# Patient Record
Sex: FEMALE | Race: BLACK OR AFRICAN AMERICAN | NOT HISPANIC OR LATINO
[De-identification: names, ages, dates, MRNs, and addresses within clinical notes are randomized per-mention and may not be internally consistent; named-entity substitution may affect disease eponyms.]

---

## 2017-02-22 NOTE — ASU PATIENT PROFILE, ADULT - PMH
Asthma    Bipolar disorder    GERD (gastroesophageal reflux disease)    Intramural leiomyoma of uterus

## 2017-03-02 PROBLEM — J45.909 UNSPECIFIED ASTHMA, UNCOMPLICATED: Chronic | Status: ACTIVE | Noted: 2017-02-22

## 2017-03-02 PROBLEM — K21.9 GASTRO-ESOPHAGEAL REFLUX DISEASE WITHOUT ESOPHAGITIS: Chronic | Status: ACTIVE | Noted: 2017-02-22

## 2017-03-02 PROBLEM — F31.9 BIPOLAR DISORDER, UNSPECIFIED: Chronic | Status: ACTIVE | Noted: 2017-02-22

## 2017-03-02 PROBLEM — D25.1 INTRAMURAL LEIOMYOMA OF UTERUS: Chronic | Status: ACTIVE | Noted: 2017-02-22

## 2017-03-04 ENCOUNTER — FORM ENCOUNTER (OUTPATIENT)
Age: 47
End: 2017-03-04

## 2017-03-05 ENCOUNTER — OUTPATIENT (OUTPATIENT)
Dept: OUTPATIENT SERVICES | Facility: HOSPITAL | Age: 47
LOS: 1 days | End: 2017-03-05
Payer: COMMERCIAL

## 2017-03-05 DIAGNOSIS — Z98.890 OTHER SPECIFIED POSTPROCEDURAL STATES: Chronic | ICD-10-CM

## 2017-03-05 DIAGNOSIS — Z41.9 ENCOUNTER FOR PROCEDURE FOR PURPOSES OTHER THAN REMEDYING HEALTH STATE, UNSPECIFIED: Chronic | ICD-10-CM

## 2017-03-05 PROCEDURE — 71250 CT THORAX DX C-: CPT | Mod: 26

## 2017-03-08 ENCOUNTER — RESULT REVIEW (OUTPATIENT)
Age: 47
End: 2017-03-08

## 2017-03-09 ENCOUNTER — OUTPATIENT (OUTPATIENT)
Dept: OUTPATIENT SERVICES | Facility: HOSPITAL | Age: 47
LOS: 1 days | Discharge: HOME | End: 2017-03-09
Payer: COMMERCIAL

## 2017-03-09 VITALS
DIASTOLIC BLOOD PRESSURE: 69 MMHG | SYSTOLIC BLOOD PRESSURE: 119 MMHG | HEART RATE: 92 BPM | OXYGEN SATURATION: 98 % | TEMPERATURE: 98 F | RESPIRATION RATE: 18 BRPM

## 2017-03-09 VITALS
RESPIRATION RATE: 18 BRPM | TEMPERATURE: 98 F | HEART RATE: 89 BPM | WEIGHT: 246.04 LBS | SYSTOLIC BLOOD PRESSURE: 129 MMHG | OXYGEN SATURATION: 99 % | HEIGHT: 63 IN | DIASTOLIC BLOOD PRESSURE: 76 MMHG

## 2017-03-09 DIAGNOSIS — Z98.890 OTHER SPECIFIED POSTPROCEDURAL STATES: Chronic | ICD-10-CM

## 2017-03-09 DIAGNOSIS — Z41.9 ENCOUNTER FOR PROCEDURE FOR PURPOSES OTHER THAN REMEDYING HEALTH STATE, UNSPECIFIED: Chronic | ICD-10-CM

## 2017-03-09 PROCEDURE — 58561 HYSTEROSCOPY REMOVE MYOMA: CPT

## 2017-03-09 PROCEDURE — 88305 TISSUE EXAM BY PATHOLOGIST: CPT

## 2017-03-09 RX ORDER — IBUPROFEN 200 MG
800 TABLET ORAL ONCE
Qty: 0 | Refills: 0 | Status: DISCONTINUED | OUTPATIENT
Start: 2017-03-09 | End: 2017-03-09

## 2017-03-09 RX ORDER — MORPHINE SULFATE 50 MG/1
4 CAPSULE, EXTENDED RELEASE ORAL
Qty: 0 | Refills: 0 | Status: DISCONTINUED | OUTPATIENT
Start: 2017-03-09 | End: 2017-03-09

## 2017-03-09 RX ORDER — SODIUM CHLORIDE 9 MG/ML
1000 INJECTION, SOLUTION INTRAVENOUS
Qty: 0 | Refills: 0 | Status: DISCONTINUED | OUTPATIENT
Start: 2017-03-09 | End: 2017-03-09

## 2017-03-09 RX ADMIN — MORPHINE SULFATE 4 MILLIGRAM(S): 50 CAPSULE, EXTENDED RELEASE ORAL at 12:50

## 2017-03-09 RX ADMIN — MORPHINE SULFATE 4 MILLIGRAM(S): 50 CAPSULE, EXTENDED RELEASE ORAL at 13:02

## 2017-03-13 LAB — SURGICAL PATHOLOGY STUDY: SIGNIFICANT CHANGE UP

## 2017-03-16 DIAGNOSIS — D25.9 LEIOMYOMA OF UTERUS, UNSPECIFIED: ICD-10-CM

## 2017-03-16 DIAGNOSIS — E66.9 OBESITY, UNSPECIFIED: ICD-10-CM

## 2017-03-16 DIAGNOSIS — Z79.899 OTHER LONG TERM (CURRENT) DRUG THERAPY: ICD-10-CM

## 2017-03-16 DIAGNOSIS — M13.88 OTHER SPECIFIED ARTHRITIS, OTHER SITE: ICD-10-CM

## 2017-03-16 DIAGNOSIS — D64.9 ANEMIA, UNSPECIFIED: ICD-10-CM

## 2017-04-13 PROCEDURE — 71250 CT THORAX DX C-: CPT

## 2017-05-07 ENCOUNTER — EMERGENCY (EMERGENCY)
Facility: HOSPITAL | Age: 47
LOS: 1 days | Discharge: HOME | End: 2017-05-07
Attending: EMERGENCY MEDICINE | Admitting: EMERGENCY MEDICINE
Payer: MEDICAID

## 2017-05-07 VITALS
SYSTOLIC BLOOD PRESSURE: 140 MMHG | TEMPERATURE: 99 F | HEIGHT: 63 IN | DIASTOLIC BLOOD PRESSURE: 96 MMHG | HEART RATE: 79 BPM | OXYGEN SATURATION: 100 % | WEIGHT: 237 LBS | RESPIRATION RATE: 18 BRPM

## 2017-05-07 DIAGNOSIS — Z41.9 ENCOUNTER FOR PROCEDURE FOR PURPOSES OTHER THAN REMEDYING HEALTH STATE, UNSPECIFIED: Chronic | ICD-10-CM

## 2017-05-07 DIAGNOSIS — M79.662 PAIN IN LEFT LOWER LEG: ICD-10-CM

## 2017-05-07 DIAGNOSIS — M79.652 PAIN IN LEFT THIGH: ICD-10-CM

## 2017-05-07 DIAGNOSIS — M79.651 PAIN IN RIGHT THIGH: ICD-10-CM

## 2017-05-07 DIAGNOSIS — Z98.890 OTHER SPECIFIED POSTPROCEDURAL STATES: Chronic | ICD-10-CM

## 2017-05-07 DIAGNOSIS — J45.909 UNSPECIFIED ASTHMA, UNCOMPLICATED: ICD-10-CM

## 2017-05-07 PROCEDURE — 99283 EMERGENCY DEPT VISIT LOW MDM: CPT

## 2017-05-07 RX ORDER — IBUPROFEN 200 MG
800 TABLET ORAL ONCE
Qty: 0 | Refills: 0 | Status: COMPLETED | OUTPATIENT
Start: 2017-05-07 | End: 2017-05-07

## 2017-05-07 RX ORDER — IBUPROFEN 200 MG
1 TABLET ORAL
Qty: 15 | Refills: 0
Start: 2017-05-07 | End: 2017-05-12

## 2017-05-07 RX ADMIN — Medication 800 MILLIGRAM(S): at 20:21

## 2017-05-07 NOTE — ED PROVIDER NOTE - MUSCULOSKELETAL, MLM
no spinal tend, FROM; LE: + symmetry, no calf swelling b/l, no tend b/l, + light touch b/l, soft compartments, + discomfort to thighs w/ambulation -- normal gait, pedal pulse 2+ b/l

## 2017-05-07 NOTE — ED ADULT TRIAGE NOTE - CHIEF COMPLAINT QUOTE
Pt presented to ED with bilateral legs pain. Pt denies any injury, long hr driving nor air travel. As per pt, onset of pain was 4 days ago and it gradually worsened. Pt has ambulated to ED with steady gait.

## 2017-05-07 NOTE — ED ADULT NURSE NOTE - OBJECTIVE STATEMENT
Patient arrived to ED via walk-in stating, "My legs hurt."  Patient is A&Ox3, in NAD, complaining of 9/10 atraumatic leg pain with numbness to R 5th toe for four days.  Patient denies any other complaints at this time.  Will continue with plan of care.

## 2017-05-07 NOTE — ED PROVIDER NOTE - OBJECTIVE STATEMENT
The pt is a 47 y/o F, who presents to ED c/o thigh pain x few d. Pt states that is on her feet all day at work, pain to thighs only, 5/10, took 400 mg motrin w/o relief yest. Denies injury, decreased ROM, calf swelling or pain, cp, sob, fevers, chills, non smoker

## 2017-05-07 NOTE — ED PROVIDER NOTE - MEDICAL DECISION MAKING DETAILS
atraumatic thigh pain b/l, no calf pain or swelling, no DVT risk factors, normal exam - pain reproducible w/ambulation, consistent w/muscular, will tx nsaids and to rest, f/u w/pmd

## 2017-07-18 ENCOUNTER — EMERGENCY (EMERGENCY)
Facility: HOSPITAL | Age: 47
LOS: 1 days | Discharge: HOME | End: 2017-07-18
Admitting: EMERGENCY MEDICINE
Payer: MEDICAID

## 2017-07-18 VITALS
DIASTOLIC BLOOD PRESSURE: 92 MMHG | OXYGEN SATURATION: 99 % | RESPIRATION RATE: 16 BRPM | WEIGHT: 229.94 LBS | HEIGHT: 63 IN | SYSTOLIC BLOOD PRESSURE: 151 MMHG | TEMPERATURE: 98 F | HEART RATE: 82 BPM

## 2017-07-18 DIAGNOSIS — Z98.890 OTHER SPECIFIED POSTPROCEDURAL STATES: Chronic | ICD-10-CM

## 2017-07-18 DIAGNOSIS — J45.909 UNSPECIFIED ASTHMA, UNCOMPLICATED: ICD-10-CM

## 2017-07-18 DIAGNOSIS — Z41.9 ENCOUNTER FOR PROCEDURE FOR PURPOSES OTHER THAN REMEDYING HEALTH STATE, UNSPECIFIED: Chronic | ICD-10-CM

## 2017-07-18 DIAGNOSIS — F17.200 NICOTINE DEPENDENCE, UNSPECIFIED, UNCOMPLICATED: ICD-10-CM

## 2017-07-18 DIAGNOSIS — M79.671 PAIN IN RIGHT FOOT: ICD-10-CM

## 2017-07-18 DIAGNOSIS — Z79.1 LONG TERM (CURRENT) USE OF NON-STEROIDAL ANTI-INFLAMMATORIES (NSAID): ICD-10-CM

## 2017-07-18 PROCEDURE — 73630 X-RAY EXAM OF FOOT: CPT | Mod: 26,RT

## 2017-07-18 PROCEDURE — 99283 EMERGENCY DEPT VISIT LOW MDM: CPT | Mod: 25

## 2017-07-18 PROCEDURE — 73630 X-RAY EXAM OF FOOT: CPT

## 2017-07-18 PROCEDURE — 73630 X-RAY EXAM OF FOOT: CPT | Mod: 26

## 2017-07-18 RX ORDER — IBUPROFEN 200 MG
600 TABLET ORAL ONCE
Qty: 0 | Refills: 0 | Status: COMPLETED | OUTPATIENT
Start: 2017-07-18 | End: 2017-07-18

## 2017-07-18 RX ADMIN — Medication 600 MILLIGRAM(S): at 22:17

## 2017-07-18 NOTE — ED ADULT NURSE NOTE - OBJECTIVE STATEMENT
45 y/o female c/o right foot pain for two weeks. states it is more swollen and painful while standing/walking and when asleep. denies any trauma/injury.

## 2017-07-18 NOTE — ED PROVIDER NOTE - MEDICAL DECISION MAKING DETAILS
right foot pain x 1 week along lateral aspect 5th MT and plantar aspect along heel-midfoot.  has been doing a lot of walking, so concern for stress fx or possibly plantar fasciitis.  No signs of infection, no foreign body appreciated on exam or XR, no e/o compartment syndrome.  XR neg for fx.  Plan f/u podiatry. right foot pain x 1 week along lateral aspect 5th MT and plantar aspect along heel-midfoot.  has been doing a lot of walking, so concern for stress fx; doubt plantar fasciitis.  Good circulation in foot and toes.  No signs of infection, no foreign body appreciated on exam or XR, no e/o compartment syndrome.  XR neg for fx.  Plan f/u podiatry.

## 2017-07-18 NOTE — ED ADULT TRIAGE NOTE - CHIEF COMPLAINT QUOTE
"I have pain to my right foot for about one week and it hurts very bad to walk on it and I have swelling on and off". Pt denies injury to foot.

## 2017-07-18 NOTE — ED PROVIDER NOTE - PHYSICAL EXAMINATION
CONSTITUTIONAL: WD,WN. NAD.    SKIN: Normal color and turgor. No rash.    EYES: Conjunctiva clear. Pupils equal and round.  ENT: Airway patent, OP clear. Nasal mucosa clear, no rhinorrhea.   RESPIRATORY:  Breathing non-labored. No retractions or accessory muscle use.  Lungs CTA bilat.  CARDIOVASCULAR:  RRR, S1S2. No M/R/G.      GI:  Abdomen soft, nontender.    MSK: No joint swelling.  No neck or back tenderness.  No calf swelling or tenderness. TTP along base of right 5th MT. Flatfooted, but no swelling of foot.  + 2 DP and PT pulses.  Cap refill < 2 sec in toes.  NEURO: Alert and oriented; Normal strength in all extremities.  SILT.  Ambulates with mild limp.

## 2017-07-18 NOTE — ED PROVIDER NOTE - OBJECTIVE STATEMENT
pt with pain in right foot x 1 week.  pain located mainly along lateral aspect of the foot.  painful to walk, but it also seems to be worse upon waking in the morning.  no history of trauma, but has had increase in amount of time spent on her feet at work and walking recently.  no tingling or numbness in toes.  no leg or foot swelling.  takes gabapentin for chronic back pain.

## 2017-07-18 NOTE — ED ADULT NURSE NOTE - CHPI ED SYMPTOMS NEG
no weakness/no fever/no numbness/no deformity/no tingling/no abrasion/no bruising/no back pain/no stiffness

## 2017-07-18 NOTE — ED PROVIDER NOTE - NS ED ROS FT
CONSTITUTIONAL: No fever, chills, or weakness  NEURO: No headache, no dizziness, no syncope.  No focal weakness.  EYES: No visual changes  ENT: No rhinorrhea or sore throat  PULM: No cough or dyspnea  CV: No chest pain or palpitations  GI:  No diarrhea or fecal incontinence.  :  No urinary retention or incontinence.  MSK: chronic back pain, at baseline without recent change  SKIN: no rash

## 2017-08-09 ENCOUNTER — EMERGENCY (EMERGENCY)
Facility: HOSPITAL | Age: 47
LOS: 1 days | Discharge: HOME | End: 2017-08-09
Admitting: EMERGENCY MEDICINE
Payer: COMMERCIAL

## 2017-08-09 VITALS
RESPIRATION RATE: 18 BRPM | SYSTOLIC BLOOD PRESSURE: 118 MMHG | DIASTOLIC BLOOD PRESSURE: 86 MMHG | HEART RATE: 97 BPM | TEMPERATURE: 98 F | OXYGEN SATURATION: 98 % | WEIGHT: 231.04 LBS

## 2017-08-09 VITALS
OXYGEN SATURATION: 99 % | SYSTOLIC BLOOD PRESSURE: 107 MMHG | DIASTOLIC BLOOD PRESSURE: 72 MMHG | TEMPERATURE: 98 F | RESPIRATION RATE: 18 BRPM | HEART RATE: 90 BPM

## 2017-08-09 DIAGNOSIS — M25.561 PAIN IN RIGHT KNEE: ICD-10-CM

## 2017-08-09 DIAGNOSIS — Z98.890 OTHER SPECIFIED POSTPROCEDURAL STATES: Chronic | ICD-10-CM

## 2017-08-09 DIAGNOSIS — Z41.9 ENCOUNTER FOR PROCEDURE FOR PURPOSES OTHER THAN REMEDYING HEALTH STATE, UNSPECIFIED: Chronic | ICD-10-CM

## 2017-08-09 DIAGNOSIS — Z79.1 LONG TERM (CURRENT) USE OF NON-STEROIDAL ANTI-INFLAMMATORIES (NSAID): ICD-10-CM

## 2017-08-09 PROCEDURE — 96372 THER/PROPH/DIAG INJ SC/IM: CPT

## 2017-08-09 PROCEDURE — 73562 X-RAY EXAM OF KNEE 3: CPT

## 2017-08-09 PROCEDURE — 73562 X-RAY EXAM OF KNEE 3: CPT | Mod: 26,RT

## 2017-08-09 PROCEDURE — 99284 EMERGENCY DEPT VISIT MOD MDM: CPT

## 2017-08-09 PROCEDURE — 99283 EMERGENCY DEPT VISIT LOW MDM: CPT | Mod: 25

## 2017-08-09 RX ORDER — KETOROLAC TROMETHAMINE 30 MG/ML
60 SYRINGE (ML) INJECTION ONCE
Qty: 0 | Refills: 0 | Status: DISCONTINUED | OUTPATIENT
Start: 2017-08-09 | End: 2017-08-09

## 2017-08-09 RX ADMIN — Medication 60 MILLIGRAM(S): at 13:12

## 2017-08-09 NOTE — ED ADULT NURSE NOTE - OBJECTIVE STATEMENT
46 year old female patient with c/o of R knee pain since last night.  Denies trauma or falls.  No distress noted.

## 2017-08-09 NOTE — ED ADULT NURSE NOTE - CHPI ED SYMPTOMS NEG
no vomiting/no fever/no decreased eating/drinking/no pain/no numbness/no dizziness/no chills/no weakness/no nausea/no tingling

## 2017-08-09 NOTE — DOWNTIME INTERRUPTION NOTE - WHICH MANUAL FORMS INITIATED?
documents scanned Nursing documentation completed electronically, otherwise on downtime. documents scanned

## 2018-02-07 ENCOUNTER — APPOINTMENT (OUTPATIENT)
Dept: PULMONOLOGY | Facility: CLINIC | Age: 48
End: 2018-02-07
Payer: MEDICAID

## 2018-02-07 PROCEDURE — 71046 X-RAY EXAM CHEST 2 VIEWS: CPT

## 2018-02-07 PROCEDURE — 94010 BREATHING CAPACITY TEST: CPT

## 2018-02-07 PROCEDURE — 99214 OFFICE O/P EST MOD 30 MIN: CPT | Mod: 25

## 2018-07-29 NOTE — ED PROVIDER NOTE - ENMT, MLM
Airway patent, Nasal mucosa clear. Mouth with normal mucosa. no fever, no chills, no night sweats, no palpitations, no cough, no nausea, no vomiting, no diarrhea, no abd pain, no numbness, no tingling, no weakness

## 2019-02-13 ENCOUNTER — EMERGENCY (EMERGENCY)
Facility: HOSPITAL | Age: 49
LOS: 1 days | Discharge: HOME | End: 2019-02-13
Attending: EMERGENCY MEDICINE | Admitting: EMERGENCY MEDICINE
Payer: MEDICAID

## 2019-02-13 VITALS
RESPIRATION RATE: 20 BRPM | TEMPERATURE: 97 F | OXYGEN SATURATION: 98 % | HEIGHT: 63 IN | HEART RATE: 86 BPM | DIASTOLIC BLOOD PRESSURE: 101 MMHG | SYSTOLIC BLOOD PRESSURE: 165 MMHG | WEIGHT: 240.08 LBS

## 2019-02-13 VITALS
SYSTOLIC BLOOD PRESSURE: 141 MMHG | DIASTOLIC BLOOD PRESSURE: 96 MMHG | RESPIRATION RATE: 18 BRPM | HEART RATE: 70 BPM | OXYGEN SATURATION: 97 %

## 2019-02-13 DIAGNOSIS — R51 HEADACHE: ICD-10-CM

## 2019-02-13 DIAGNOSIS — F31.9 BIPOLAR DISORDER, UNSPECIFIED: ICD-10-CM

## 2019-02-13 DIAGNOSIS — Z41.9 ENCOUNTER FOR PROCEDURE FOR PURPOSES OTHER THAN REMEDYING HEALTH STATE, UNSPECIFIED: Chronic | ICD-10-CM

## 2019-02-13 DIAGNOSIS — Z79.1 LONG TERM (CURRENT) USE OF NON-STEROIDAL ANTI-INFLAMMATORIES (NSAID): ICD-10-CM

## 2019-02-13 DIAGNOSIS — Z98.890 OTHER SPECIFIED POSTPROCEDURAL STATES: Chronic | ICD-10-CM

## 2019-02-13 DIAGNOSIS — J45.909 UNSPECIFIED ASTHMA, UNCOMPLICATED: ICD-10-CM

## 2019-02-13 LAB
ALBUMIN SERPL ELPH-MCNC: 4.6 G/DL — SIGNIFICANT CHANGE UP (ref 3.3–5)
ALP SERPL-CCNC: 75 U/L — SIGNIFICANT CHANGE UP (ref 40–120)
ALT FLD-CCNC: 19 U/L — SIGNIFICANT CHANGE UP (ref 10–45)
ANION GAP SERPL CALC-SCNC: 17 MMOL/L — SIGNIFICANT CHANGE UP (ref 5–17)
AST SERPL-CCNC: 24 U/L — SIGNIFICANT CHANGE UP (ref 10–40)
BASOPHILS # BLD AUTO: 0.03 K/UL — SIGNIFICANT CHANGE UP (ref 0–0.2)
BASOPHILS NFR BLD AUTO: 0.6 % — SIGNIFICANT CHANGE UP (ref 0–2)
BILIRUB SERPL-MCNC: 0.6 MG/DL — SIGNIFICANT CHANGE UP (ref 0.2–1.2)
BUN SERPL-MCNC: 21 MG/DL — SIGNIFICANT CHANGE UP (ref 7–23)
CALCIUM SERPL-MCNC: 10 MG/DL — SIGNIFICANT CHANGE UP (ref 8.4–10.5)
CHLORIDE SERPL-SCNC: 100 MMOL/L — SIGNIFICANT CHANGE UP (ref 96–108)
CO2 SERPL-SCNC: 24 MMOL/L — SIGNIFICANT CHANGE UP (ref 22–31)
CREAT SERPL-MCNC: 0.6 MG/DL — SIGNIFICANT CHANGE UP (ref 0.5–1.3)
EOSINOPHIL # BLD AUTO: 0.09 K/UL — SIGNIFICANT CHANGE UP (ref 0–0.5)
EOSINOPHIL NFR BLD AUTO: 1.7 % — SIGNIFICANT CHANGE UP (ref 0–6)
GLUCOSE SERPL-MCNC: 114 MG/DL — HIGH (ref 70–99)
HCG SERPL-ACNC: <.1 MIU/ML — SIGNIFICANT CHANGE UP
HCT VFR BLD CALC: 43.5 % — SIGNIFICANT CHANGE UP (ref 34.5–45)
HGB BLD-MCNC: 14.6 G/DL — SIGNIFICANT CHANGE UP (ref 11.5–15.5)
IMM GRANULOCYTES NFR BLD AUTO: 0.2 % — SIGNIFICANT CHANGE UP (ref 0–1.5)
LIDOCAIN IGE QN: 27 U/L — SIGNIFICANT CHANGE UP (ref 7–60)
LYMPHOCYTES # BLD AUTO: 1.76 K/UL — SIGNIFICANT CHANGE UP (ref 1–3.3)
LYMPHOCYTES # BLD AUTO: 33.7 % — SIGNIFICANT CHANGE UP (ref 13–44)
MAGNESIUM SERPL-MCNC: 2.2 MG/DL — SIGNIFICANT CHANGE UP (ref 1.6–2.6)
MCHC RBC-ENTMCNC: 31.1 PG — SIGNIFICANT CHANGE UP (ref 27–34)
MCHC RBC-ENTMCNC: 33.6 GM/DL — SIGNIFICANT CHANGE UP (ref 32–36)
MCV RBC AUTO: 92.8 FL — SIGNIFICANT CHANGE UP (ref 80–100)
MONOCYTES # BLD AUTO: 0.37 K/UL — SIGNIFICANT CHANGE UP (ref 0–0.9)
MONOCYTES NFR BLD AUTO: 7.1 % — SIGNIFICANT CHANGE UP (ref 2–14)
NEUTROPHILS # BLD AUTO: 2.97 K/UL — SIGNIFICANT CHANGE UP (ref 1.8–7.4)
NEUTROPHILS NFR BLD AUTO: 56.7 % — SIGNIFICANT CHANGE UP (ref 43–77)
NRBC # BLD: 0 /100 WBCS — SIGNIFICANT CHANGE UP (ref 0–0)
PLATELET # BLD AUTO: 367 K/UL — SIGNIFICANT CHANGE UP (ref 150–400)
POTASSIUM SERPL-MCNC: 3.7 MMOL/L — SIGNIFICANT CHANGE UP (ref 3.5–5.3)
POTASSIUM SERPL-SCNC: 3.7 MMOL/L — SIGNIFICANT CHANGE UP (ref 3.5–5.3)
PROT SERPL-MCNC: 7.8 G/DL — SIGNIFICANT CHANGE UP (ref 6–8.3)
RAPID RVP RESULT: SIGNIFICANT CHANGE UP
RBC # BLD: 4.69 M/UL — SIGNIFICANT CHANGE UP (ref 3.8–5.2)
RBC # FLD: 11.7 % — SIGNIFICANT CHANGE UP (ref 10.3–14.5)
SODIUM SERPL-SCNC: 141 MMOL/L — SIGNIFICANT CHANGE UP (ref 135–145)
WBC # BLD: 5.23 K/UL — SIGNIFICANT CHANGE UP (ref 3.8–10.5)
WBC # FLD AUTO: 5.23 K/UL — SIGNIFICANT CHANGE UP (ref 3.8–10.5)

## 2019-02-13 PROCEDURE — 36415 COLL VENOUS BLD VENIPUNCTURE: CPT

## 2019-02-13 PROCEDURE — 87581 M.PNEUMON DNA AMP PROBE: CPT

## 2019-02-13 PROCEDURE — 96374 THER/PROPH/DIAG INJ IV PUSH: CPT

## 2019-02-13 PROCEDURE — 85025 COMPLETE CBC W/AUTO DIFF WBC: CPT

## 2019-02-13 PROCEDURE — 70450 CT HEAD/BRAIN W/O DYE: CPT | Mod: 26

## 2019-02-13 PROCEDURE — 71046 X-RAY EXAM CHEST 2 VIEWS: CPT | Mod: 26

## 2019-02-13 PROCEDURE — 71046 X-RAY EXAM CHEST 2 VIEWS: CPT

## 2019-02-13 PROCEDURE — 70450 CT HEAD/BRAIN W/O DYE: CPT

## 2019-02-13 PROCEDURE — 83735 ASSAY OF MAGNESIUM: CPT

## 2019-02-13 PROCEDURE — 83690 ASSAY OF LIPASE: CPT

## 2019-02-13 PROCEDURE — 80053 COMPREHEN METABOLIC PANEL: CPT

## 2019-02-13 PROCEDURE — 84702 CHORIONIC GONADOTROPIN TEST: CPT

## 2019-02-13 PROCEDURE — 87633 RESP VIRUS 12-25 TARGETS: CPT

## 2019-02-13 PROCEDURE — 99284 EMERGENCY DEPT VISIT MOD MDM: CPT | Mod: 25

## 2019-02-13 PROCEDURE — 87798 DETECT AGENT NOS DNA AMP: CPT

## 2019-02-13 PROCEDURE — 99284 EMERGENCY DEPT VISIT MOD MDM: CPT

## 2019-02-13 PROCEDURE — 87486 CHLMYD PNEUM DNA AMP PROBE: CPT

## 2019-02-13 RX ORDER — ACETAMINOPHEN 500 MG
650 TABLET ORAL ONCE
Qty: 0 | Refills: 0 | Status: COMPLETED | OUTPATIENT
Start: 2019-02-13 | End: 2019-02-13

## 2019-02-13 RX ORDER — METOCLOPRAMIDE HCL 10 MG
10 TABLET ORAL ONCE
Qty: 0 | Refills: 0 | Status: COMPLETED | OUTPATIENT
Start: 2019-02-13 | End: 2019-02-13

## 2019-02-13 RX ORDER — SODIUM CHLORIDE 9 MG/ML
1000 INJECTION INTRAMUSCULAR; INTRAVENOUS; SUBCUTANEOUS ONCE
Qty: 0 | Refills: 0 | Status: COMPLETED | OUTPATIENT
Start: 2019-02-13 | End: 2019-02-13

## 2019-02-13 RX ORDER — AMLODIPINE BESYLATE 2.5 MG/1
5 TABLET ORAL ONCE
Qty: 0 | Refills: 0 | Status: COMPLETED | OUTPATIENT
Start: 2019-02-13 | End: 2019-02-13

## 2019-02-13 RX ORDER — AMLODIPINE BESYLATE 2.5 MG/1
1 TABLET ORAL
Qty: 30 | Refills: 0
Start: 2019-02-13 | End: 2019-03-14

## 2019-02-13 RX ORDER — KETOROLAC TROMETHAMINE 30 MG/ML
30 SYRINGE (ML) INJECTION ONCE
Qty: 0 | Refills: 0 | Status: DISCONTINUED | OUTPATIENT
Start: 2019-02-13 | End: 2019-02-13

## 2019-02-13 RX ADMIN — Medication 650 MILLIGRAM(S): at 21:18

## 2019-02-13 RX ADMIN — Medication 30 MILLIGRAM(S): at 22:35

## 2019-02-13 RX ADMIN — SODIUM CHLORIDE 1000 MILLILITER(S): 9 INJECTION INTRAMUSCULAR; INTRAVENOUS; SUBCUTANEOUS at 21:19

## 2019-02-13 RX ADMIN — Medication 10 MILLIGRAM(S): at 21:19

## 2019-02-13 RX ADMIN — AMLODIPINE BESYLATE 5 MILLIGRAM(S): 2.5 TABLET ORAL at 21:18

## 2019-02-13 NOTE — ED PROVIDER NOTE - CLINICAL SUMMARY MEDICAL DECISION MAKING FREE TEXT BOX
several recent elevated BP values at visits + counseled -> supportive care and feeling improved -> CT results relayed and f/u stressed

## 2019-02-13 NOTE — ED ADULT NURSE NOTE - OBJECTIVE STATEMENT
pt received into  spot A&Ox3 ambulatory appears comfortable complaining of nonproductive cough head ache and high blood pressure. States she went to [facility] care was given tessalon pearls without relief. Pt has 9/10 head ache denies vision changes N/V fall trauma/ injury. Reports she has been told her BP was high in the past but was never on medications. No numbness/ weakness no slurred speech or facial droop. 20G PIV placed to RAC labs drawn and sent pt in nad informed and agreeable to plan will monitor and reassess

## 2019-02-13 NOTE — ED ADULT NURSE NOTE - NSIMPLEMENTINTERV_GEN_ALL_ED
Implemented All Universal Safety Interventions:  [place] to call system. Call bell, personal items and telephone within reach. Instruct patient to call for assistance. Room bathroom lighting operational. Non-slip footwear when patient is off stretcher. Physically safe environment: no spills, clutter or unnecessary equipment. Stretcher in lowest position, wheels locked, appropriate side rails in place.

## 2019-02-13 NOTE — ED ADULT TRIAGE NOTE - CHIEF COMPLAINT QUOTE
Patient c/o severe headache , nausea , feeling tired , cough and nasal congestion for 5 days . Denies any vomiting nor blurry vision .

## 2019-02-13 NOTE — ED PROVIDER NOTE - ATTENDING CONTRIBUTION TO CARE
I have seen the pt, reviewed all pertinent clinical data, and I agree with the documentation/care/plan executed by TEDDY Hays.

## 2019-03-07 NOTE — ED ADULT NURSE NOTE - PRO INTERPRETER NEED 2
Called and left msg for pt to call back to establish care regarding Anticoagulation referral for Dionne from [facility] on 2/5/19.    Unable to establish care, will send letter and FYI to referring provider.     [facility] at [phone], fax [phone]    Suzette Camarena, ReginaD      
English

## 2019-06-03 ENCOUNTER — APPOINTMENT (OUTPATIENT)
Dept: HEART AND VASCULAR | Facility: CLINIC | Age: 49
End: 2019-06-03
Payer: MEDICAID

## 2019-06-03 VITALS
SYSTOLIC BLOOD PRESSURE: 120 MMHG | HEART RATE: 117 BPM | DIASTOLIC BLOOD PRESSURE: 80 MMHG | TEMPERATURE: 98.3 F | HEIGHT: 62 IN | BODY MASS INDEX: 44.16 KG/M2 | OXYGEN SATURATION: 98 % | WEIGHT: 240 LBS

## 2019-06-03 DIAGNOSIS — Z72.3 LACK OF PHYSICAL EXERCISE: ICD-10-CM

## 2019-06-03 DIAGNOSIS — Z83.3 FAMILY HISTORY OF DIABETES MELLITUS: ICD-10-CM

## 2019-06-03 DIAGNOSIS — I10 ESSENTIAL (PRIMARY) HYPERTENSION: ICD-10-CM

## 2019-06-03 DIAGNOSIS — Z87.891 PERSONAL HISTORY OF NICOTINE DEPENDENCE: ICD-10-CM

## 2019-06-03 DIAGNOSIS — Z78.9 OTHER SPECIFIED HEALTH STATUS: ICD-10-CM

## 2019-06-03 DIAGNOSIS — R55 SYNCOPE AND COLLAPSE: ICD-10-CM

## 2019-06-03 PROCEDURE — G0446: CPT | Mod: 59

## 2019-06-03 PROCEDURE — G0444 DEPRESSION SCREEN ANNUAL: CPT | Mod: 59

## 2019-06-03 PROCEDURE — 99401 PREV MED CNSL INDIV APPRX 15: CPT

## 2019-06-03 PROCEDURE — 93000 ELECTROCARDIOGRAM COMPLETE: CPT

## 2019-06-03 PROCEDURE — G0447 BEHAVIOR COUNSEL OBESITY 15M: CPT | Mod: 59

## 2019-06-03 PROCEDURE — 99205 OFFICE O/P NEW HI 60 MIN: CPT | Mod: 25

## 2019-06-03 RX ORDER — OMEPRAZOLE 40 MG/1
40 CAPSULE, DELAYED RELEASE ORAL DAILY
Refills: 0 | Status: ACTIVE | COMMUNITY

## 2019-06-03 RX ORDER — AMLODIPINE BESYLATE 5 MG/1
5 TABLET ORAL
Qty: 90 | Refills: 3 | Status: ACTIVE | COMMUNITY
Start: 2019-06-03

## 2019-06-03 NOTE — HISTORY OF PRESENT ILLNESS
[FreeTextEntry1] : \par \par 48 AA obese F HTN recently started on amlodipine myltiple urgent care and ed visits for syncope and htn urgency. currenlty no scp sob palp, feels very fatigues\par \par ekg sinus tach 117bpm normal itnervals no st changes\par \par fhx nc\par \par sochx non smoker

## 2019-06-03 NOTE — PHYSICAL EXAM
[General Appearance - Well Developed] : well developed [Normal Appearance] : normal appearance [Well Groomed] : well groomed [General Appearance - Well Nourished] : well nourished [No Deformities] : no deformities [General Appearance - In No Acute Distress] : no acute distress [Normal Conjunctiva] : the conjunctiva exhibited no abnormalities [Eyelids - No Xanthelasma] : the eyelids demonstrated no xanthelasmas [Normal Oral Mucosa] : normal oral mucosa [No Oral Pallor] : no oral pallor [No Oral Cyanosis] : no oral cyanosis [Normal Jugular Venous A Waves Present] : normal jugular venous A waves present [Normal Jugular Venous V Waves Present] : normal jugular venous V waves present [No Jugular Venous Saavedra A Waves] : no jugular venous saavedra A waves [Heart Rate And Rhythm] : heart rate and rhythm were normal [Heart Sounds] : normal S1 and S2 [Murmurs] : no murmurs present [Respiration, Rhythm And Depth] : normal respiratory rhythm and effort [Exaggerated Use Of Accessory Muscles For Inspiration] : no accessory muscle use [Auscultation Breath Sounds / Voice Sounds] : lungs were clear to auscultation bilaterally [Abdomen Tenderness] : non-tender [Abdomen Soft] : soft [Abdomen Mass (___ Cm)] : no abdominal mass palpated [Abnormal Walk] : normal gait [Gait - Sufficient For Exercise Testing] : the gait was sufficient for exercise testing [Nail Clubbing] : no clubbing of the fingernails [Cyanosis, Localized] : no localized cyanosis [Petechial Hemorrhages (___cm)] : no petechial hemorrhages [Skin Color & Pigmentation] : normal skin color and pigmentation [] : no rash [No Venous Stasis] : no venous stasis [Skin Lesions] : no skin lesions [No Skin Ulcers] : no skin ulcer [No Xanthoma] : no  xanthoma was observed [Oriented To Time, Place, And Person] : oriented to person, place, and time [Affect] : the affect was normal [Mood] : the mood was normal [No Anxiety] : not feeling anxious

## 2019-06-03 NOTE — DISCUSSION/SUMMARY
[FreeTextEntry1] : The number of diagnostic and/or management options \par CAD, HTN, HPL, CV Prevention\par \par HTN - check 2D echo, if central pressures are nromal consider trial off anti htn and ambiu monitor possibly over medicated\par \par syncope - sinus tach need to r/o arrythmia, will place 14 event monitor and 2d echo to eval shd\par \par \par \par \par Labs, radiology: ekg\par \par Aspirin therapy: no\par \par LDL: n/a\par \par High Complexity Medical Decision Making\par \par Annual administration of PHQ-2/9 for the benefit of the patient\par Score = 0\par Rx = Reassurance provided\par \par New patient intensive behavioral therapy for cardiovascular disease (17min)\par Assess: risk of inc CVD\par Advise: ASA utility, BP monitoring, healthy diet, result in lower risk of CAD, DM, and HTN\par Agree: pt willing to change, agrees to behavioral change to promote a healthy diet\par Assist: behavioral change re: appropriate food choices, confidence in ability lower CAD risk\par Arrange: follow up visit for progress, preventive cardiology / nutritionist referral discussed\par \par Cardiovascular Prevention counseling (16min)\par ·	Advised SBP guidelines based on ACC/AHA and SPRINT trial increased CAD PAD and mortality \par ·	Assessed willingness to attempt - contemplation stage\par ·	Agree to no added salt and added sugar diet  - prevention medicine referral, nutritionist\par ·	Assist with resources provided - prevention medicine referral, nutritionist, individual counseling\par ·	Arrange ·	Follow-up arranged - next scheduled visit\par \par BMI of >/= 30 face-to-face behavioral counseling for obesity (16 min)\par Assess: risk of inc CVD\par Advise: Need to lose at least 10lbs in next three months, result in lower risk of CAD, DM, and HTN\par Agree: pt willing to change, agrees to 10lbs goal in 3mo\par Assist: behavioral change re: appropriate food choices, confidence in ability to lose weight\par Arrange: follow up visit for progress, preventive cardiology / nutritionist referral discussed\par  \par

## 2019-10-26 ENCOUNTER — EMERGENCY (EMERGENCY)
Facility: HOSPITAL | Age: 49
LOS: 1 days | Discharge: HOME | End: 2019-10-26
Admitting: EMERGENCY MEDICINE
Payer: COMMERCIAL

## 2019-10-26 ENCOUNTER — EMERGENCY (EMERGENCY)
Facility: HOSPITAL | Age: 49
LOS: 1 days | Discharge: HOME | End: 2019-10-26
Attending: EMERGENCY MEDICINE | Admitting: EMERGENCY MEDICINE
Payer: COMMERCIAL

## 2019-10-26 VITALS
TEMPERATURE: 98 F | DIASTOLIC BLOOD PRESSURE: 90 MMHG | WEIGHT: 252.21 LBS | SYSTOLIC BLOOD PRESSURE: 140 MMHG | HEART RATE: 93 BPM | RESPIRATION RATE: 16 BRPM | OXYGEN SATURATION: 99 % | HEIGHT: 62 IN

## 2019-10-26 VITALS
DIASTOLIC BLOOD PRESSURE: 93 MMHG | TEMPERATURE: 98 F | SYSTOLIC BLOOD PRESSURE: 133 MMHG | RESPIRATION RATE: 18 BRPM | HEART RATE: 103 BPM | OXYGEN SATURATION: 98 %

## 2019-10-26 DIAGNOSIS — M79.662 PAIN IN LEFT LOWER LEG: ICD-10-CM

## 2019-10-26 DIAGNOSIS — Z98.890 OTHER SPECIFIED POSTPROCEDURAL STATES: Chronic | ICD-10-CM

## 2019-10-26 DIAGNOSIS — Z41.9 ENCOUNTER FOR PROCEDURE FOR PURPOSES OTHER THAN REMEDYING HEALTH STATE, UNSPECIFIED: Chronic | ICD-10-CM

## 2019-10-26 PROCEDURE — 99283 EMERGENCY DEPT VISIT LOW MDM: CPT

## 2019-10-26 PROCEDURE — 99283 EMERGENCY DEPT VISIT LOW MDM: CPT | Mod: 25

## 2019-10-26 PROCEDURE — 96372 THER/PROPH/DIAG INJ SC/IM: CPT

## 2019-10-26 RX ORDER — METHOCARBAMOL 500 MG/1
1000 TABLET, FILM COATED ORAL ONCE
Refills: 0 | Status: COMPLETED | OUTPATIENT
Start: 2019-10-26 | End: 2019-10-26

## 2019-10-26 RX ORDER — ESOMEPRAZOLE MAGNESIUM 40 MG/1
1 CAPSULE, DELAYED RELEASE ORAL
Qty: 0 | Refills: 0 | DISCHARGE

## 2019-10-26 RX ORDER — KETOROLAC TROMETHAMINE 30 MG/ML
60 SYRINGE (ML) INJECTION ONCE
Refills: 0 | Status: DISCONTINUED | OUTPATIENT
Start: 2019-10-26 | End: 2019-10-26

## 2019-10-26 RX ORDER — IBUPROFEN 200 MG
600 TABLET ORAL ONCE
Refills: 0 | Status: DISCONTINUED | OUTPATIENT
Start: 2019-10-26 | End: 2019-11-04

## 2019-10-26 RX ORDER — GABAPENTIN 400 MG/1
0 CAPSULE ORAL
Qty: 0 | Refills: 0 | DISCHARGE

## 2019-10-26 RX ADMIN — Medication 60 MILLIGRAM(S): at 09:41

## 2019-10-26 RX ADMIN — METHOCARBAMOL 1000 MILLIGRAM(S): 500 TABLET, FILM COATED ORAL at 09:41

## 2019-10-26 RX ADMIN — Medication 60 MILLIGRAM(S): at 10:00

## 2019-10-26 NOTE — ED ADULT TRIAGE NOTE - ARRIVAL INFO ADDITIONAL COMMENTS
pt c/o several days of left leg pain.  seen at urgent care yesterday and tx'd with flexeril.  hx of back pain but none with this.

## 2019-10-26 NOTE — ED PROVIDER NOTE - PATIENT PORTAL LINK FT
You can access the FollowMyHealth Patient Portal offered by [facility] by registering at the following website: [email or web address]. By joining [facility]’s FollowMyHealth portal, you will also be able to view your health information using other applications (apps) compatible with our system.

## 2019-10-26 NOTE — ED ADULT NURSE NOTE - CHIEF COMPLAINT QUOTE
patient c/o left leg pain since wednesday , went to urgent care flexeril was prescribed with no relief . Denies any injury .

## 2019-10-26 NOTE — ED PROVIDER NOTE - PHYSICAL EXAMINATION
CONSTITUTIONAL: Well appearing, well nourished, awake, alert and in no apparent distress.  HEENT: Head is atraumatic. Eyes clear bilaterally, normal EOMI. Airway patent.  CARDIAC: Normal rate, regular rhythm.  Heart sounds S1, S2.   RESPIRATORY: Breath sounds clear and equal bilaterally. no tachypnea, respiratory distress.   GASTROINTESTINAL: Abdomen soft, non-tender, no guarding, distension.  MUSCULOSKELETAL: Spine appears normal, no midline spinal tenderness, range of motion is not limited, no muscle or joint tenderness. no bony tenderness. focal pain on palpation of calf and posterior thigh, no varicose veins, asymmetry of calves  NEUROLOGICAL: Alert and oriented, no focal deficits, no motor or sensory deficits.  SKIN: Skin normal color for race, warm, dry and intact. No evidence of rash.  PSYCHIATRIC: Alert and oriented to person, place, time/situation. normal mood and affect. no apparent risk to self or others.

## 2019-10-26 NOTE — ED ADULT NURSE NOTE - NSIMPLEMENTINTERV_GEN_ALL_ED
Implemented All Universal Safety Interventions:  [place] to call system. Call bell, personal items and telephone within reach. Instruct patient to call for assistance. Room bathroom lighting operational. Non-slip footwear when patient is off stretcher. Physically safe environment: no spills, clutter or unnecessary equipment. Stretcher in lowest position, wheels locked, appropriate side rails in place.

## 2019-10-26 NOTE — ED PROVIDER NOTE - OBJECTIVE STATEMENT
48 yo Pt previously healthy with complaints of L leg pain since past Wednesday, stated she slipped while walking and fell back. no head injury, no other pain, but following day had L leg pain, worse with walking and bending L knee. no swelling, recent trauma/surgery, hx of DVT. no sob, cp, has been using flexeril but no other medications.

## 2019-10-26 NOTE — ED ADULT NURSE NOTE - CHPI ED NUR SYMPTOMS NEG
no tingling/no weakness/no abrasion/no numbness/no fever/no difficulty bearing weight/no back pain/no bruising/no deformity/no stiffness

## 2019-10-26 NOTE — ED PROVIDER NOTE - CLINICAL SUMMARY MEDICAL DECISION MAKING FREE TEXT BOX
L leg pain after fall, no other bony injury, ambulating, low risk [place], improved after pain meds, however advised to return for any swelling or worsening sx.

## 2019-10-26 NOTE — ED ADULT NURSE NOTE - PMH
Asthma    Bipolar disorder    GERD (gastroesophageal reflux disease)    Intramural leiomyoma of uterus    Leg pain

## 2019-10-26 NOTE — ED PROVIDER NOTE - CARE PROVIDER_API CALL
Deepak Pete)  Orthopaedic Surgery; Sports Medicine  [address] [place] [address]  Phone: [phone]  Fax: [phone]  Follow Up Time:     Jeffrey Forrest)  Orthopaedic Surgery  [address] [place] [address]  Phone: [phone]  Fax: [phone]  Follow Up Time:

## 2019-10-26 NOTE — ED ADULT NURSE NOTE - OBJECTIVE STATEMENT
PT came in to ED c/o severe "sharp/shooting" pain to L leg from hip to foot. No incontinent episodes. Ambulated to ED independently.

## 2019-10-26 NOTE — ED PROVIDER NOTE - NSFOLLOWUPINSTRUCTIONS_ED_ALL_ED_FT
Hamstring Strain  Image   A hamstring strain happens when the muscles in the back of the thighs (hamstring muscles) are overstretched or torn. The hamstring muscles are used in straightening the hips, bending the knees, and pulling back the legs. This injury is often called a pulled hamstring muscle. The tissue that connects the muscle to a bone (tendon) may also be affected.  The severity of a hamstring strain may be rated in degrees or grades. First-degree (or grade 1) strains have the least amount of muscle tearing and pain. Second-degree and third-degree (grade 2 and 3) strains have increasingly more tearing and pain.  What are the causes?  This condition is caused by a sudden, violent force being placed on the hamstring muscles, stretching them too far. This often happens during activities that involve running, jumping, kicking, or weight lifting.  What increases the risk?  Hamstring strains are especially common in athletes. The following factors may also make you more likely to develop this condition:  Having low strength, endurance, or flexibility of the hamstring muscles.Doing high-impact physical activity or sports.Having poor physical fitness.Having a previous leg injury.Having tired (fatigued) muscles.What are the signs or symptoms?  Symptoms of this condition include:  Pain in the back of the thigh.Swelling.Bruising.Muscle spasms.Trouble moving the affected muscle because of pain.For severe strains, you may feel popping or snapping in the back of your thigh when the injury occurs.  How is this diagnosed?  This condition is diagnosed based on your symptoms, your medical history, and a physical exam.  How is this treated?  Treatment for this condition usually involves:  Protecting, resting, icing, applying compression, and elevating the injured area (PRICE therapy).Medicines. Your health care provider may recommend medicines to help reduce pain or inflammation.Doing exercises to regain strength and flexibility in the muscles. Your health care provider will tell you when it is okay to begin exercising.Follow these instructions at home:  PRICE therapy     ImageUse PRICE therapy to promote muscle healing during the first 2–3 days after your injury, or as told by your health care provider.  Protect the muscle from being injured again.Rest your injury. This usually involves limiting your normal activities and not using the injured hamstring muscle. Talk with your health care provider about how you should limit your activities.Apply ice to the injured area:  Put ice in a plastic bag. Place a towel between your skin and the bag. Leave the ice on for 20 minutes, 2–3 times a day. After the third day, switch to applying heat as told.Put pressure (compression) on your injured hamstring by wrapping it with an elastic bandage. Be careful not to wrap it too tightly. That may interfere with blood circulation or may increase swelling.Raise (elevate) your injured hamstring above the level of your heart as often as possible. When you are lying down, you can do this by putting a pillow under your thigh.Activity     Begin exercising or stretching only as told by your health care provider.Do not return to full activity level until your health care provider approves.To help prevent muscle strains in the future, always warm up before exercising and stretch afterward.General instructions     Take over-the-counter and prescription medicines only as told by your health care provider.If directed, apply heat to the affected area as often as told by your health care provider. Use the heat source that your health care provider recommends, such as a moist heat pack or a heating pad.  Place a towel between your skin and the heat source. Leave the heat on for 20–30 minutes. Remove the heat if your skin turns bright red. This is especially important if you are unable to feel pain, heat, or cold. You may have a greater risk of getting burned.Keep all follow-up visits as told by your health care provider. This is important.Contact a health care provider if you have:  Increasing pain or swelling in the injured area.Numbness, tingling, or a significant loss of strength in the injured area.Get help right away if:  Your foot or your toes become cold or turn blue.Summary  A hamstring strain happens when the muscles in the back of the thighs (hamstring muscles) are overstretched or torn.This injury can be caused by a sudden, violent force being placed on the hamstring muscles, causing them to stretch too far.Symptoms include pain, swelling, and muscle spasms in the injured area.Treatment includes what is called PRICE therapy: protecting, resting, icing, applying compression, and elevating the injured area.This information is not intended to replace advice given to you by your health care provider. Make sure you discuss any questions you have with your health care provider.

## 2019-10-26 NOTE — ED ADULT NURSE NOTE - CHPI ED NUR SYMPTOMS NEG
no tingling/no weakness/no abrasion/no numbness/no fever/no stiffness/no difficulty bearing weight/no bruising/no deformity

## 2019-10-26 NOTE — ED ADULT NURSE NOTE - OBJECTIVE STATEMENT
left leg pain started Wed. after slip and fall on Tuesday when walking dog; pain is worsening -- "shooting" in calf and back of thigh ; denies other symptoms - flexeril from urgent care not helping
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
## 2019-11-01 DIAGNOSIS — M79.652 PAIN IN LEFT THIGH: ICD-10-CM

## 2019-11-01 DIAGNOSIS — M79.662 PAIN IN LEFT LOWER LEG: ICD-10-CM

## 2019-11-01 DIAGNOSIS — Z79.899 OTHER LONG TERM (CURRENT) DRUG THERAPY: ICD-10-CM
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
## 2020-03-28 NOTE — ASU PATIENT PROFILE, ADULT - PT NEEDS ASSIST
breath sounds bilateral/CXR pending/breath sounds equal/chest excursion noted/positive end tidal CO2 noted no

## 2020-07-26 ENCOUNTER — EMERGENCY (EMERGENCY)
Facility: HOSPITAL | Age: 50
LOS: 1 days | Discharge: HOME | End: 2020-07-26
Attending: EMERGENCY MEDICINE | Admitting: EMERGENCY MEDICINE
Payer: COMMERCIAL

## 2020-07-26 VITALS
WEIGHT: 259.93 LBS | HEART RATE: 93 BPM | OXYGEN SATURATION: 98 % | SYSTOLIC BLOOD PRESSURE: 132 MMHG | HEIGHT: 62 IN | TEMPERATURE: 99 F | DIASTOLIC BLOOD PRESSURE: 92 MMHG | RESPIRATION RATE: 17 BRPM

## 2020-07-26 DIAGNOSIS — Z41.9 ENCOUNTER FOR PROCEDURE FOR PURPOSES OTHER THAN REMEDYING HEALTH STATE, UNSPECIFIED: Chronic | ICD-10-CM

## 2020-07-26 DIAGNOSIS — Z98.890 OTHER SPECIFIED POSTPROCEDURAL STATES: Chronic | ICD-10-CM

## 2020-07-26 PROBLEM — M79.606 PAIN IN LEG, UNSPECIFIED: Chronic | Status: ACTIVE | Noted: 2019-10-26

## 2020-07-26 PROCEDURE — 73562 X-RAY EXAM OF KNEE 3: CPT

## 2020-07-26 PROCEDURE — 73562 X-RAY EXAM OF KNEE 3: CPT | Mod: 26,LT

## 2020-07-26 PROCEDURE — 99284 EMERGENCY DEPT VISIT MOD MDM: CPT

## 2020-07-26 PROCEDURE — 93971 EXTREMITY STUDY: CPT

## 2020-07-26 PROCEDURE — 93971 EXTREMITY STUDY: CPT | Mod: 26,LT

## 2020-07-26 RX ORDER — IBUPROFEN 200 MG
1 TABLET ORAL
Qty: 15 | Refills: 0
Start: 2020-07-26 | End: 2020-07-30

## 2020-07-26 RX ORDER — OXYCODONE AND ACETAMINOPHEN 5; 325 MG/1; MG/1
1 TABLET ORAL ONCE
Refills: 0 | Status: DISCONTINUED | OUTPATIENT
Start: 2020-07-26 | End: 2020-07-26

## 2020-07-26 RX ORDER — OXYCODONE AND ACETAMINOPHEN 5; 325 MG/1; MG/1
1 TABLET ORAL
Qty: 9 | Refills: 0
Start: 2020-07-26 | End: 2020-07-28

## 2020-07-26 RX ADMIN — OXYCODONE AND ACETAMINOPHEN 1 TABLET(S): 5; 325 TABLET ORAL at 13:34

## 2020-07-26 NOTE — ED ADULT NURSE NOTE - OBJECTIVE STATEMENT
pt states that her left knee has been hurting x 5 days, no trauma , pain in front and back of knee and down left lateral calf area , unable to fully weightbear

## 2020-07-26 NOTE — ED PROVIDER NOTE - NSFOLLOWUPINSTRUCTIONS_ED_ALL_ED_FT
R.I.C.E. Treatment  WHAT YOU NEED TO KNOW:  R.I.C.E. treatment is a 4-step process used to decrease swelling and pain caused by an injury. R.I.C.E. stands for rest, ice, compression, and elevation. R.I.C.E. should be done within 24 to 48 hours after an injury.Ice and Elevation  Ice and Elevation  DISCHARGE INSTRUCTIONS:  Return to the emergency department if:   Your pain is severe.  You have severe swelling or deformity.  You have numbness in the injured area.  Contact your healthcare provider if:   Your pain and swelling does not go away after a few days.  You have questions or concerns about your condition or care.  How to use R.I.C.E. treatment:   Rest your injured area as directed. You may need to stop using, or keep weight off, the injury for 48 hours or longer. Your healthcare provider may recommend crutches or another device. Return to your usual activities as directed.   Apply ice on your injured area for 15 to 20 minutes every 4 hours or as directed. Use an ice pack, or put crushed ice in a plastic bag. Cover it with a towel. Ice helps prevent tissue damage and decreases swelling and pain.  Compress, or keep pressure on, the injured area. Compression will help decrease swelling and support the injured area. Use an elastic bandage, air stirrup, splint, or sling as directed. If you use an elastic bandage to wrap your injured area, make sure the bandage is not too tight.   Elevate the injured area above the level of your heart as often as you can. This will help decrease swelling and pain. Prop the injured area on pillows or blankets to keep it elevated comfortably.  Bakers Cyst  WHAT YOU NEED TO KNOW:  A Bakers cyst, or popliteal cyst, is a bulging lump behind your knee. Inside the lump is a sac filled with fluid. The cyst is caused by fluid buildup in your knee joint. This can happen if you have a knee injury, such as a cartilage tear. Osteoarthritis or rheumatoid arthritis can also cause an abnormal buildup of joint fluid.   DISCHARGE INSTRUCTIONS:  Return to the emergency department if:   You have severe pain.  You have bruising on the ankle below the cyst.  Your calf turns blue below the cyst.  Your calf or knee is swollen or bleeding  Contact your healthcare provider if:   You have a fever.  Your pain does not improve with medicine.  You have questions or concerns about your condition or care.  Medicines:   NSAIDs help decrease swelling and pain. This medicine is available without a doctor's order. Your healthcare provider will tell you which medicine to take and how often to take it. Follow directions. NSAIDs can cause stomach bleeding or kidney problems if they are not taken correctly.  Take your medicine as directed. Contact your healthcare provider if you think your medicine is not helping or if you have side effects. Tell him of her if you are allergic to any medicine. Keep a list of the medicines, vitamins, and herbs you take. Include the amounts, and when and why you take them. Bring the list or the pill bottles to follow-up visits. Carry your medicine list with you in case of an emergency.  Care for your knee:   Rest as needed. Limit movement as your knee heals. This will help decrease the risk of more damage to your knee. You may need crutches to take weight off your injured knee. Use crutches as directed.  Ice your knee. Ice helps decrease swelling and pain. Use an ice pack, or put ice in a plastic bag. Cover the ice pack with a towel and place the ice on your knee for 15 to 20 minutes, 3 to 4 times each day. Do this for 2 to 3 days.  Support your knee. Wrap your knee with an elastic bandage. Ask your healthcare provider if you need a brace for more support. This will help decrease swelling and movement so your knee can heal.  Elevate your knee. Use pillows to raise your knee above the level of your heart as often as you can. This will help decrease swelling.  Go to physical therapy as directed. A physical therapist teaches you exercises to help improve movement and strength, and to decrease pain

## 2020-07-26 NOTE — ED PROVIDER NOTE - OBJECTIVE STATEMENT
The pt is a 48 y/o F, who presents to ED c/o L knee pain x 3 wks. Pt states pain is getting worse, she has been applying heat to her knee and noticed that swelling is getting worse, pain is 8/10, constant, aggravated w/walking, noticed a bulge to back of knee as well, has been taking tyl and advil w/o relief. Denies trauma, fall, decreased ROM, numbness or tingling to toes, calf pain or swelling, fevers, chills.

## 2020-07-26 NOTE — ED PROVIDER NOTE - PROVIDER TOKENS
PROVIDER:[TOKEN:[[id number]]],PROVIDER:[TOKEN:[[id number]]],PROVIDER:[TOKEN:[[id number]]],PROVIDER:[TOKEN:[[id number]]]

## 2020-07-26 NOTE — ED PROVIDER NOTE - CLINICAL SUMMARY MEDICAL DECISION MAKING FREE TEXT BOX
pt c/o knee pain x 3 wks, atraumatic, exam limited by body habitus, given pain meds, xrays , doppler , suspect msk vs arthritis, ace wrap and cane for ambulatory comfort/support, to RICE and con't nsaids, f/u w/ortho for eval and ? mri ? PT / further tx, pt understands and agrees w/plan, strict return precautions given pt c/o knee pain x 3 wks, atraumatic, exam limited by body habitus, given pain meds, xrays w/effusion/djd , doppler w/baker's cyst , no concern for septic joint, ace wrap and cane for ambulatory comfort/support, to RICE and con't nsaids, f/u w/ortho for eval and ? mri ? PT ? baker's cyst drainage / further tx, pt understands and agrees w/plan, strict return precautions given

## 2020-07-26 NOTE — ED PROVIDER NOTE - ATTENDING CONTRIBUTION TO CARE
as per HPI. No neuro/systemic symptoms. Vitals wnl, exam as above.  In ED: US w/ bakers cyst.  Clinically no indication for further emergent ED workup or hospitalization at this time. Comfortable for dc, outpt f/u.   ddx: Likely 2/2 bakers cyst. Clinically not infectious or DVT.

## 2020-07-26 NOTE — ED ADULT TRIAGE NOTE - CHIEF COMPLAINT QUOTE
"I have been having pain to my left knee for 5 days and I feel a lump to the back and side". no fever and no chills.

## 2020-07-26 NOTE — ED ADULT NURSE NOTE - ISOLATION TYPE:
Constitutional: No fever or chills +lethargy  Eyes: No visual changes, eye pain or redness  HEENT: No throat pain, ear pain, nasal pain. No nose bleeding.  CV: No chest pain or lower extremity edema  Resp: No SOB no cough  GI: No abd pain. No nausea or vomiting. No diarrhea. No constipation.   : No dysuria, hematuria.   MSK: No musculoskeletal pain  Skin: No rash  Neuro: No headache. No numbness or tingling. No weakness. None

## 2020-07-26 NOTE — ED PROVIDER NOTE - PHYSICAL EXAMINATION
Klepfish: exam lmtd by obesity. no bony ttp. ?mild L knee effusion, no other LE edema, normal equal distal pulses. no joint warmth/erythema. FROM all joints. normal valgus/varus stress, negative anterior/posterior drawer test. strength 5/5. normal plantar/dorsiflexion.

## 2020-07-26 NOTE — ED PROVIDER NOTE - PATIENT PORTAL LINK FT
You can access the FollowMyHealth Patient Portal offered by [facility] by registering at the following website: [email or web address]. By joining [facility]’s FollowMyHealth portal, you will also be able to view your health information using other applications (apps) compatible with our system.

## 2020-07-26 NOTE — ED PROVIDER NOTE - MUSCULOSKELETAL, MLM
Spine appears normal, range of motion is not limited; L Knee: no discolorations, no warmth, + small supra patella effusion, quads intact, FROM, no direct bony tend, no ligamentous laxity appreciated but limited exam, no popliteal tend, no palpable cords, + tend over proximal calf w/o swelling, neg obinna's sign, pedal pulse 2+, soft compartments, pedal pulse 2+, muscle strength 5/5, good resistance

## 2020-07-26 NOTE — ED PROVIDER NOTE - CARE PROVIDERS DIRECT ADDRESSES
,DirectAddress_Unknown,DirectAddress_Unknown,DirectAddress_Unknown,[email or web address]

## 2020-07-26 NOTE — ED PROVIDER NOTE - CARE PROVIDER_API CALL
Matt Chawla)  [facility] Orthopaedic [facility] Surgery  [address] [place] [address]  Phone: [phone]  Fax: [phone]  Follow Up Time:     Holger Reed  ORTHOPAEDIC SURGERY  [address] [place] [address]  Phone: [phone]  Fax: [phone]  Follow Up Time:     Jeffrey Forrest  ORTHOPAEDIC SURGERY  [address] [address]  Phone: [phone]  Fax: [phone]  Follow Up Time:     Kwame Alcantara  ORTHOPAEDIC SURGERY  [address] [place] [address]  Phone: [phone]  Fax: [phone]  Follow Up Time:

## 2020-07-29 DIAGNOSIS — M25.462 EFFUSION, LEFT KNEE: ICD-10-CM

## 2020-07-30 DIAGNOSIS — M25.462 EFFUSION, LEFT KNEE: ICD-10-CM

## 2020-07-30 DIAGNOSIS — M25.562 PAIN IN LEFT KNEE: ICD-10-CM

## 2020-07-30 DIAGNOSIS — M25.569 PAIN IN UNSPECIFIED KNEE: ICD-10-CM

## 2020-07-31 ENCOUNTER — APPOINTMENT (OUTPATIENT)
Dept: ORTHOPEDIC SURGERY | Facility: CLINIC | Age: 50
End: 2020-07-31
Payer: MEDICAID

## 2020-07-31 ENCOUNTER — OUTPATIENT (OUTPATIENT)
Dept: OUTPATIENT SERVICES | Facility: HOSPITAL | Age: 50
LOS: 1 days | End: 2020-07-31
Payer: COMMERCIAL

## 2020-07-31 VITALS
WEIGHT: 239 LBS | DIASTOLIC BLOOD PRESSURE: 78 MMHG | HEIGHT: 62 IN | HEART RATE: 100 BPM | BODY MASS INDEX: 43.98 KG/M2 | SYSTOLIC BLOOD PRESSURE: 132 MMHG | OXYGEN SATURATION: 97 % | TEMPERATURE: 97.9 F

## 2020-07-31 DIAGNOSIS — M25.462 EFFUSION, LEFT KNEE: ICD-10-CM

## 2020-07-31 DIAGNOSIS — Z98.890 OTHER SPECIFIED POSTPROCEDURAL STATES: Chronic | ICD-10-CM

## 2020-07-31 DIAGNOSIS — Z41.9 ENCOUNTER FOR PROCEDURE FOR PURPOSES OTHER THAN REMEDYING HEALTH STATE, UNSPECIFIED: Chronic | ICD-10-CM

## 2020-07-31 LAB
B PERT IGG+IGM PNL SER: SIGNIFICANT CHANGE UP
COLOR FLD: SIGNIFICANT CHANGE UP
EOSINOPHIL # FLD: 2 % — SIGNIFICANT CHANGE UP
FLUID INTAKE SUBSTANCE CLASS: SIGNIFICANT CHANGE UP
FLUID SEGMENTED GRANULOCYTES: 44 % — SIGNIFICANT CHANGE UP
GRAM STN FLD: SIGNIFICANT CHANGE UP
LYMPHOCYTES # FLD: 30 % — SIGNIFICANT CHANGE UP
MONOS+MACROS # FLD: 24 % — SIGNIFICANT CHANGE UP
RCV VOL RI: HIGH /UL (ref 0–0)
SPECIMEN SOURCE FLD: SIGNIFICANT CHANGE UP
SPECIMEN SOURCE: SIGNIFICANT CHANGE UP
SYNOVIAL CRYSTALS CLARITY: ABNORMAL
SYNOVIAL CRYSTALS COLOR: ABNORMAL
SYNOVIAL CRYSTALS ID: SIGNIFICANT CHANGE UP
SYNOVIAL CRYSTALS TUBE: SIGNIFICANT CHANGE UP
TOTAL NUCLEATED CELL COUNT, BODY FLUID: 134 /UL — SIGNIFICANT CHANGE UP
TUBE TYPE: SIGNIFICANT CHANGE UP

## 2020-07-31 PROCEDURE — 89051 BODY FLUID CELL COUNT: CPT

## 2020-07-31 PROCEDURE — 84560 ASSAY OF URINE/URIC ACID: CPT

## 2020-07-31 PROCEDURE — 87075 CULTR BACTERIA EXCEPT BLOOD: CPT

## 2020-07-31 PROCEDURE — 89060 EXAM SYNOVIAL FLUID CRYSTALS: CPT

## 2020-07-31 PROCEDURE — 87070 CULTURE OTHR SPECIMN AEROBIC: CPT

## 2020-07-31 PROCEDURE — 20611 DRAIN/INJ JOINT/BURSA W/US: CPT | Mod: LT

## 2020-07-31 PROCEDURE — 99204 OFFICE O/P NEW MOD 45 MIN: CPT | Mod: 25

## 2020-07-31 PROCEDURE — 87205 SMEAR GRAM STAIN: CPT

## 2020-07-31 RX ORDER — HYALURONATE SOD, CROSS-LINKED 30 MG/3 ML
30 SYRINGE (ML) INTRAARTICULAR
Qty: 1 | Refills: 0 | Status: ACTIVE | COMMUNITY
Start: 2020-07-31 | End: 1900-01-01

## 2020-07-31 NOTE — PHYSICAL EXAM
[de-identified] : General: Well-nourished, well-developed, alert, and in no acute distress.\par Head: Normocephalic.\par Eyes: Pupils equal round reactive to light and accommodation, extraocular muscles intact, normal sclera.\par Nose: No nasal discharge.\par Cardiac: Regular rate. Extremities are warm and well perfused. Distal pulses are symmetric bilaterally.\par Respiratory: No labored breathing.\par Extremities: Sensation is intact distally bilaterally.  Distal pulses are symmetric bilaterally\par Lymphatic: No regional lymphadenopathy, no lymphedema\par Neurologic: No focal deficits\par Skin: Normal skin color, texture, and turgor\par Psychiatric: Normal affect\par MSK: as noted above/below\par \par RIGHT KNEE:\par \par Inspection: no bruising, swelling, erythema\par Joint Effusion:no \par ROM: Knee Flexion 130 , Knee Extension 0\par Palpation:No pain at joint line, patellar tendon, MFC/LFC, Medial/Lateral Tibial Plateau\par Leg Length Discrepancy:no\par Patella: no apprehension\par Distal Pulses: normal\par Lower Extremity Strength:normal, 5/5 \par Lower Extremity Reflexes:normal, 2+\par Lower Extremity Sensation: normal\par \par Special Tests:\par Jenniffer:Negative \par Abdi: Negative\par Anterior Drawer:Negative\par Posterior Drawer:Negative \par Varus/Valgus:Negative, no instability\par \par LEFT KNEE:\par \par Inspection: no bruising, erythema\par Joint Effusion:MILD\par ROM: Knee Flexion 120 DEGREES WITH PAIN AT END FLEXION , Knee Extension 0\par Palpation:MEDIAL AND LATERAL JOINT LINE PAIN, PATELLAR FACET PAIN, No pain at  patellar tendon, MFC/LFC, Medial/Lateral Tibial Plateau\par Leg Length Discrepancy:no\par Patella: +APPREHENSION, PAIN WITH COMPRESSION\par Distal Pulses: normal\par Lower Extremity Strength:5-/5\par Lower Extremity Reflexes:normal, 2+\par Lower Extremity Sensation: normal\par \par Special Tests:\par [facility]:POSITIVE MEDIALLY\par Anterior Drawer:Negative\par Posterior Drawer:Negative \par Varus/Valgus:Negative, no instability\par  [de-identified] : Xray LEFT KNEE FROM 7/27/2020 - Multiple views were reviewed with the patient in detail.  \par \par Three views left knee. There is tricompartmental osteoarthritis. No fracture or dislocation. Moderate suprapatellar joint effusion present. \par \par Left Lower Extremity Venous Duplex - Multiple views were reviewed with the patient in detail. \par \par 1. No deep vein thrombosis seen. \par 2. 4.0 mildly complex cystic structure in left popliteal fossa probably baker's cyst. \par 3. Moderate left knee effusion corresponding to patient's reported area of pain. Joint fluid aspiration may provide additional diagnostic information to exclude septic arthritis. \par

## 2020-07-31 NOTE — PROCEDURE
[de-identified] : Ultrasound-Guided LEFT Knee Arthrocentesis\par \par Indication for U/S Guidance: Ensure placement within the tibiofemoral joint for diagnostic purposes, while avoiding neurovascular structures\par \par Indication for Injection: KNEE EFFUSION, KNEE OSTEOARTHRITIS\par \par A discussion was had with the patient regarding this procedure and all questions were answered. All risks, benefits and alternatives were discussed. These included but were not limited to bleeding, infection, and allergic reaction. A timeout was done to ensure correct side and pt agreed to the procedure. Betadine was used to sterilize and prep the area, and alcohol was used to clean the skin in the anterior aspect of the knee joint. The suprapatellar space was visualized utilizing the Sonosite, linear transducer. The joint was visualized in the short axis and an in-plane approach was used for the injection. Ultrasound guidance was utilized to ensure accuracy of the intra-articular aspiration, and avoid the neurovascular structures. A 25-gauge 1.5" needle was used to inject 4cc of 1% lidocaine without epi into the subQ.  This was followed by aspiration with an 18-gauge 1.5" needle with aspiration of 13cc of nonpurulent, mildly cloudy, blood-tinged synovial fluid.  This was followed with injection of 1cc of KENALOG.   Synovial fluid was sent for analysis. A sterile bandage was then applied. The patient tolerated the procedure well and there were no complications.\par

## 2020-07-31 NOTE — HISTORY OF PRESENT ILLNESS
[de-identified] : The patient is a 49 year old woman presenting with left knee pain.\par \par The patient presents with a three month history of atraumatic, left knee pain.  The patient denies precipitating injury or trauma.  She first started to notice the pain while walking her dog.  Her pain was first localized to her lateral knee and lower leg, and then migrated to her anteromedial knee.  She denies focal hip or low back pain.  The patient denies mechanical symptoms including catching, locking, buckling.  She endorses swelling without bruising.  The patient denies constitutional symptoms including fever, chills, malaise, weight loss, night sweats.  She denies personal or family history of rheumatologic disease.  She had xrays which showed tricompartmental osteoarthritis, and venous duplex which was negative for DVT, but showed a popliteal cyst.\par \par Pain is rated 10/10, described as sharp/burning/cramping/stabbing, improved with ice/medication, worse with walking, prolonged standing.

## 2020-07-31 NOTE — DISCUSSION/SUMMARY
[de-identified] : The patient is a 49 year old woman presenting with a several month history of atraumatic left knee pain and swelling.  Her pain is consistent with knee osteoarthritis.  Low level suspicion for septic arthritis.  She also has a baker's cyst, likely related to underlying OA\par \par Imaging/Diagnostics were interpreted and results were reviewed with the patient in detail.  All questions were answered appropriately.\par \par I discussed the treatment of degenerative arthritis with the patient at length today. I described the spectrum of treatment from nonoperative modalities to total joint arthroplasty. Noninvasive and nonoperative treatment modalities include weight reduction, activity modification with low impact exercise, PRN use of acetaminophen or anti-inflammatory medication if tolerated, natural supplements such as glucosamine/chondroitin, and physical therapy. Further treatments can include corticosteroid injection, hyaluronic acid injections, and orthobiologic such as PRP. Definitive treatment can certainly include total joint arthroplasty, but patient would require surgical consultation to discuss that option further. The risks and benefits of each treatment options was discussed and all questions were answered.\par \par \par After informed consent, and explanation of risks, benefits, alternatives, adverse effects of injection, which includes but is not limited to infection, bleeding, allergic reaction, swelling, failure to improve symptoms, the patient would like to proceed with the procedure - LEFT KNEE ULTRASOUND-GUIDED CORTICOSTEROID INJECTION. See procedure note above. Patient tolerated the procedure well. The patient was provided with postinjection instructions.\par \par Follow-up synovial fluid analysis.\par \par The patient was also provided some general home exercises.  The patient was counseled on activity modification.\par \par Follow-up in 4-6 weeks.  We discussed potential HA injections in the future.  Consider MRI if symptoms persist.\par ------------------------------------------------------------------------------------------------------------------\par Patient appreciates and agrees with current plan.\par \par This note was generated using a mixture of manual typing and dragon medical dictation software.  A reasonable effort has been made for proofreading its contents, but typos may still remain.  If there are any questions or points of clarification needed please notify my office.\par \par >45 minutes of time was spent on total encounter.  >50% of the visit was spent on counseling/coordination of care and medical-decision making for this patient.\par

## 2020-08-01 LAB
SPECIMEN SOURCE FLD: SIGNIFICANT CHANGE UP
URATE FLD-MCNC: 4.2 MG/DL — SIGNIFICANT CHANGE UP

## 2020-08-04 LAB — MUCIN CLOT SNV QL: ABNORMAL

## 2020-08-05 RX ORDER — TIZANIDINE 4 MG/1
4 TABLET ORAL
Qty: 30 | Refills: 0 | Status: ACTIVE | COMMUNITY
Start: 2020-06-19

## 2020-08-05 RX ORDER — TOPIRAMATE 50 MG/1
50 TABLET, FILM COATED ORAL
Qty: 30 | Refills: 0 | Status: ACTIVE | COMMUNITY
Start: 2020-07-08

## 2020-08-05 RX ORDER — ALBUTEROL SULFATE 90 UG/1
108 (90 BASE) INHALANT RESPIRATORY (INHALATION)
Qty: 8 | Refills: 0 | Status: ACTIVE | COMMUNITY
Start: 2020-04-24

## 2020-08-05 RX ORDER — ETODOLAC 400 MG/1
400 TABLET, FILM COATED ORAL TWICE DAILY
Qty: 60 | Refills: 1 | Status: ACTIVE | COMMUNITY
Start: 2020-08-05 | End: 1900-01-01

## 2020-08-05 RX ORDER — SEMAGLUTIDE 1.34 MG/ML
2 INJECTION, SOLUTION SUBCUTANEOUS
Qty: 2 | Refills: 0 | Status: ACTIVE | COMMUNITY
Start: 2020-06-30

## 2020-08-05 RX ORDER — CYANOCOBALAMIN 1000 UG/ML
1000 INJECTION INTRAMUSCULAR; SUBCUTANEOUS
Qty: 3 | Refills: 0 | Status: ACTIVE | COMMUNITY
Start: 2019-10-29

## 2020-08-05 RX ORDER — FLUTICASONE FUROATE AND VILANTEROL TRIFENATATE 200; 25 UG/1; UG/1
200-25 POWDER RESPIRATORY (INHALATION)
Qty: 60 | Refills: 0 | Status: ACTIVE | COMMUNITY
Start: 2020-04-24

## 2020-08-22 LAB
CULTURE RESULTS: NO GROWTH — SIGNIFICANT CHANGE UP
SPECIMEN SOURCE: SIGNIFICANT CHANGE UP

## 2020-09-01 ENCOUNTER — APPOINTMENT (OUTPATIENT)
Dept: ORTHOPEDIC SURGERY | Facility: CLINIC | Age: 50
End: 2020-09-01
Payer: COMMERCIAL

## 2020-09-01 DIAGNOSIS — M54.10 RADICULOPATHY, SITE UNSPECIFIED: ICD-10-CM

## 2020-09-01 PROCEDURE — 99213 OFFICE O/P EST LOW 20 MIN: CPT

## 2020-09-01 RX ORDER — PREGABALIN 25 MG/1
25 CAPSULE ORAL TWICE DAILY
Qty: 60 | Refills: 0 | Status: ACTIVE | COMMUNITY
Start: 2020-09-01 | End: 1900-01-01

## 2020-09-01 RX ORDER — OXYCODONE AND ACETAMINOPHEN 5; 325 MG/1; MG/1
5-325 TABLET ORAL
Qty: 9 | Refills: 0 | Status: DISCONTINUED | COMMUNITY
Start: 2020-07-26 | End: 2020-09-01

## 2020-09-01 RX ORDER — CYCLOBENZAPRINE HYDROCHLORIDE 10 MG/1
10 TABLET, FILM COATED ORAL
Qty: 30 | Refills: 0 | Status: DISCONTINUED | COMMUNITY
Start: 2020-04-24 | End: 2020-09-01

## 2020-09-01 RX ORDER — GABAPENTIN 100 MG/1
100 CAPSULE ORAL
Qty: 90 | Refills: 0 | Status: DISCONTINUED | COMMUNITY
Start: 2020-04-24 | End: 2020-09-01

## 2020-09-01 NOTE — HISTORY OF PRESENT ILLNESS
[de-identified] : The patient is a 49 year old woman presenting for follow-up for left knee pain.\par \par The patient was last seen about 5 weeks ago for a three month history of atraumatic, left knee pain.  The patient denies precipitating injury or trauma.  She first started to notice the pain while walking her dog.  Her pain was first localized to her lateral knee and lower leg, and then migrated to her anteromedial knee.  She had xrays which showed tricompartmental osteoarthritis, and venous duplex which was negative for DVT, but showed a popliteal cyst.  At her last visit, she had a knee arthrocentesis which was aseptic without crystals, and she had cortisone injection.  She states that her primary knee pain and swelling has improved.\par \par She continues to complain of left anterolateral calf pain.  She denies new precipitating injury or trauma.  She denies calf swelling, erythema, ecchymosis.  She has some weakness with toe-off on the left.  She has dysesthesias in the left L5 territory.  She used to follow with a pain management specialist, Dr. Thrasher, and had a Lumbar Spine MRI in July 2020 at [facility] which showed:\par \par Multilevel degenerative disc disease with disc material within the left L5/T5wuiitt foramen which encroaches on the exiting left L5 nerve root and a mild right L4/L5 foraminal disc herniation which contacts exiting L4 nerve root.\par \par She was previously on gabapentin for neuropathic pain which did not improve her symptoms.  She has been going to physical therapy.\par \par

## 2020-09-01 NOTE — PHYSICAL EXAM
[de-identified] : General: Well-nourished, well-developed, alert, and in no acute distress.\par Head: Normocephalic.\par Eyes: Pupils equal round reactive to light and accommodation, extraocular muscles intact, normal sclera.\par Nose: No nasal discharge.\par Cardiac: Regular rate. Extremities are warm and well perfused. Distal pulses are symmetric bilaterally.\par Respiratory: No labored breathing.\par Extremities: Sensation is intact distally bilaterally.  Distal pulses are symmetric bilaterally\par Lymphatic: No regional lymphadenopathy, no lymphedema\par Neurologic: No focal deficits\par Skin: Normal skin color, texture, and turgor\par Psychiatric: Normal affect\par MSK: as noted above/below\par \par RIGHT KNEE:\par \par Inspection: no bruising, swelling, erythema\par Joint Effusion:no \par ROM: Knee Flexion 130 , Knee Extension 0\par Palpation:No pain at joint line, patellar tendon, MFC/LFC, Medial/Lateral Tibial Plateau\par Leg Length Discrepancy:no\par Patella: no apprehension\par Distal Pulses: normal\par Lower Extremity Strength:normal, 5/5 \par Lower Extremity Reflexes:normal, 2+\par Lower Extremity Sensation: normal\par \par Special Tests:\par Jenniffer:Negative \par Abdi: Negative\par Anterior Drawer:Negative\par Posterior Drawer:Negative \par Varus/Valgus:Negative, no instability\par \par LEFT KNEE:\par \par Inspection: no bruising, erythema\par Joint Effusion:none\par ROM: Knee Flexion IMPROVED  DEGREES , Knee Extension 0\par Palpation:NO JOINT LINE PAIN, MILD PATELLAR FACET PAIN, No pain at  patellar tendon, MFC/LFC, Medial/Lateral Tibial Plateau\par Leg Length Discrepancy:no\par Patella: MILD PAIN WITH COMPRESSION\par Distal Pulses: normal\par Lower Extremity Strength:5-/5\par Lower Extremity Reflexes:normal, 2+\par Lower Extremity Sensation: normal\par \par Special Tests:\par Jenniffer:NEGATIVE\par Anterior Drawer:Negative\par Posterior Drawer:Negative \par Varus/Valgus:Negative, no instability\par \par RIGHT LEG:\par \par Inspection: no bruising, swelling or rash\par Bony Prominence: none\par Range of Motion: Knee flexion 130 degrees, knee extension 0 degrees\par Palpation: no tenderness posterior medial tibia, anterior tibial cortex,  no calf pain\par Leg length discrepancy: no\par Distal Pulses: Intact\par Lower extremity strength: Knee Extension 5/5 with no pain, Knee Flexion 5/5 with no pain, Hip Flexion 5/5 with no pain, Hip Abduction 5/5 with no pain, PlantarFlexion 5/5, Dorsiflexion 5/5, Eversion 5/5, Inversion 5/5  \par Lower extremity reflexes: 2 Plus \par Lower extremity sensation: Intact\par \par Special Test:\par Dennys Sign: Negative\par Her Test: Negative\par \par LEFT LEG:\par \par Inspection: no bruising, swelling or rash\par Bony Prominence: none\par Range of Motion: Knee flexion 130 degrees, knee extension 0 degrees\par Palpation:TENDERNESS AT LATERAL LEG COMPARTMENT WITHOUT PALPABLE GAP, NO ACHILLES TENDERNESS OR GAP, no tenderness posterior medial tibia, anterior tibial cortex, \par Leg length discrepancy: no\par Distal Pulses: Intact\par Lower extremity strength: Knee Extension 5/5 with no pain, Knee Flexion 5/5 with no pain, Hip Flexion 5/5 with no pain, Hip Abduction 5/5 with no pain, PlantarFlexion 4+/5, Dorsiflexion 5/5, Eversion 5/5, Inversion 5/5 \par Lower extremity reflexes: 2 Plus \par Lower extremity sensation: Intact\par \par Special Test:\par Dennys Sign: Negative\par Her Test: Negative\par TOE RISE: PAINFUL/WEAK ON LEFT\par HEEL RISE: NORMAL\par

## 2020-09-01 NOTE — DISCUSSION/SUMMARY
[Medication Risks Reviewed] : Medication risks reviewed [de-identified] : The patient is a 49 year old woman presenting with a several month history of atraumatic left knee pain and swelling.  Her pain is consistent with knee osteoarthritis.  Her knee pain has resolved post cortisone injection.\par \par She complains of persistent, left anterolateral leg pain.  She has had Lower Extremity Doppler which was negative for DVT.  Low suspicion for gastrocnemius tear.  She has history of lumbar degenerative disc disease, and had MRI L-spine showing left L5 foraminal stenosis.  I suspect her pain is related to radiculopathy.\par \par I recommended consultation with Spine Surgery.  Patient given referral today.\par \par We will trial other gabaergic medications.  Patient was prescribed a short course of Pregabalin Prior to prescribing, history of the patient's scheduled medication use was reviewed utilizing the I:STOP [place]  aware program.  Appropriate use of medication was discussed with the patient in detail.  The risks, benefits, adverse effects, alternative options were discussed.  The patient expressed understanding.\par \par Patient instructed to inform her psychiatrist of new medication change, and for medication reconciliation as needed.\par \par Follow-up in 2 weeks via telehealth or in-office visit for medication management.  Otherwise, see spine surgery.  Will consider Neurology referral for peripheral neuropathy.\par \par Continue PT\par \par ------------------------------------------------------------------------------------------------------------------\par Patient appreciates and agrees with current plan.\par \par This note was generated using a mixture of manual typing and dragon medical dictation software.  A reasonable effort has been made for proofreading its contents, but typos may still remain.  If there are any questions or points of clarification needed please notify my office.\par \par >15 minutes of time was spent on total encounter.  >50% of the visit was spent on counseling/coordination of care and medical-decision making for this patient.\par

## 2020-09-09 ENCOUNTER — APPOINTMENT (OUTPATIENT)
Dept: ORTHOPEDIC SURGERY | Facility: CLINIC | Age: 50
End: 2020-09-09
Payer: MEDICAID

## 2020-09-09 VITALS
DIASTOLIC BLOOD PRESSURE: 75 MMHG | OXYGEN SATURATION: 97 % | TEMPERATURE: 97.2 F | HEIGHT: 63 IN | HEART RATE: 97 BPM | SYSTOLIC BLOOD PRESSURE: 130 MMHG | BODY MASS INDEX: 46.07 KG/M2 | WEIGHT: 260 LBS

## 2020-09-09 DIAGNOSIS — M48.061 SPINAL STENOSIS, LUMBAR REGION WITHOUT NEUROGENIC CLAUDICATION: ICD-10-CM

## 2020-09-09 DIAGNOSIS — M54.16 RADICULOPATHY, LUMBAR REGION: ICD-10-CM

## 2020-09-09 PROCEDURE — 99215 OFFICE O/P EST HI 40 MIN: CPT

## 2020-09-09 RX ORDER — ETODOLAC 400 MG/1
400 TABLET, FILM COATED ORAL
Qty: 60 | Refills: 0 | Status: ACTIVE | COMMUNITY
Start: 2020-09-09 | End: 1900-01-01

## 2020-09-09 RX ORDER — CYCLOBENZAPRINE HYDROCHLORIDE 5 MG/1
5 TABLET, FILM COATED ORAL 3 TIMES DAILY
Qty: 30 | Refills: 0 | Status: ACTIVE | COMMUNITY
Start: 2020-09-09 | End: 1900-01-01

## 2020-09-10 NOTE — DISCUSSION/SUMMARY
[de-identified] : Discussed the results of the patient's history, physical exam, and imaging. MRI lumbar shows L5/S1 grade 1 anterolisthesis, disc bulge and posterior element hypertrophy with severe left foraminal stenosis. There is no indication for surgical intervention at this time. I have recommended a nonsteroidal anti-inflammatory, etodolac, to be taken twice daily with food for 2 weeks.  If this is helpful I have instructed the patient to take it for an additional 2 weeks and then PRN afterwards. Also given prescription for cyclobenzaprine to take for muscle spasm as needed. I am recommending an evaluation by pain management for consideration of TAWANNA, referral to Dr. Osullivan given. The patient will follow up with me in 3-4 months, sooner if there is an issue.  All questions were answered.\par \par \par \par

## 2020-09-10 NOTE — PHYSICAL EXAM
[de-identified] : MRI lumbar 7/2020: L5/S1 grade 1 anterolisthesis, disc bulge and posterior element hypertrophy with severe left foraminal stenosis; multilevel disc bulges [de-identified] : General: patient is well developed, well nourished, in no acute \par distress, alert and oriented x 3. \par \par Mood and affect: normal\par \par Respiratory: no respiratory distress noted\par \par Skin: no scars over spine, skin intact, no erythema, increased warmth\par \par Alignment:The spine is well compensated in the coronal and sagittal plane.  There is no asymmetry on the langley forward bend test\par \par Gait: The patient is able to toe walk and heel walk without difficulty. The patient is able to tandem gait without difficulty.\par \par Palpation: no tenderness to palpation spine or paraspinal region\par \par Range of motion: Lumbar spine ROM is restricted\par \par Neurologic Exam:\par Motor: Manual Muscle testing in the lower extremities is 5 out of 5 in all muscle groups. There is no evidence of muscular atrophy in the lower extremities. Sensory: Sensation to light touch is grossly intact in the lower extremities\par \par Reflexes: DTR are present and symmetric throughout, negative minor bilaterally, negative inverted radial reflex bilaterally, no clonus, plantar responses are flexor\par \par Hip Exam: No pain with internal or external rotation of hips bilaterally\par \par Special tests: Straight leg raise test negative.  Cross straight leg test negative.  FELISHA test negative\par \par Vascular: Examination of the peripheral vascular system demonstrates no evidence of congestion or edema. no evidence of lymphedema bilateral lower extremities, pulses are present and symmetric in both lower extremities.\par \par

## 2020-09-10 NOTE — HISTORY OF PRESENT ILLNESS
[de-identified] : Ms. العلي is a very pleasant 49 year old female who complains of left calf pain for approximately 3-4 months, atraumatic onset. Also reports isolated left anterior knee pain and swelling. Was seen by Dr. Aleman, reports complete relief of knee and left calf pain for 2-3 days with left knee aspiration/injection. Over past several weeks, has developed left buttock pain that radiates posteriorly down her entire left lower extremity.\par \par The patient reports no loss of hand dexterity.\par The patient states there no loss of balance when walking.\par There no sensory loss in the arms or legs\par The patent no difficulty with urination.\par \par The patient no history of previous spine surgery.\par \par The patient has no history of unexpected weight loss, no history of active cancer, no history bladder or bowel dysfunction, no night pain, no fevers or chills.\par \par The past medical history, surgical history, family history, allergies, medications, 10+ point review of systems, family history and social history were reviewed and non contributory.\par \par

## 2020-10-16 ENCOUNTER — EMERGENCY (EMERGENCY)
Facility: HOSPITAL | Age: 50
LOS: 1 days | Discharge: HOME | End: 2020-10-16
Attending: EMERGENCY MEDICINE | Admitting: EMERGENCY MEDICINE
Payer: COMMERCIAL

## 2020-10-16 VITALS
SYSTOLIC BLOOD PRESSURE: 142 MMHG | TEMPERATURE: 98 F | DIASTOLIC BLOOD PRESSURE: 100 MMHG | HEART RATE: 90 BPM | RESPIRATION RATE: 18 BRPM | HEIGHT: 62 IN | OXYGEN SATURATION: 98 %

## 2020-10-16 DIAGNOSIS — Z98.890 OTHER SPECIFIED POSTPROCEDURAL STATES: Chronic | ICD-10-CM

## 2020-10-16 DIAGNOSIS — Z41.9 ENCOUNTER FOR PROCEDURE FOR PURPOSES OTHER THAN REMEDYING HEALTH STATE, UNSPECIFIED: Chronic | ICD-10-CM

## 2020-10-16 PROCEDURE — 96372 THER/PROPH/DIAG INJ SC/IM: CPT

## 2020-10-16 PROCEDURE — 99283 EMERGENCY DEPT VISIT LOW MDM: CPT | Mod: 25

## 2020-10-16 PROCEDURE — 99284 EMERGENCY DEPT VISIT MOD MDM: CPT

## 2020-10-16 RX ORDER — LIDOCAINE 4 G/100G
1 CREAM TOPICAL ONCE
Refills: 0 | Status: COMPLETED | OUTPATIENT
Start: 2020-10-16 | End: 2020-10-16

## 2020-10-16 RX ORDER — DIAZEPAM 5 MG
5 TABLET ORAL ONCE
Refills: 0 | Status: DISCONTINUED | OUTPATIENT
Start: 2020-10-16 | End: 2020-10-16

## 2020-10-16 RX ORDER — KETOROLAC TROMETHAMINE 30 MG/ML
60 SYRINGE (ML) INJECTION ONCE
Refills: 0 | Status: DISCONTINUED | OUTPATIENT
Start: 2020-10-16 | End: 2020-10-16

## 2020-10-16 RX ORDER — METHOCARBAMOL 500 MG/1
2 TABLET, FILM COATED ORAL
Qty: 18 | Refills: 0
Start: 2020-10-16 | End: 2020-10-18

## 2020-10-16 RX ADMIN — Medication 60 MILLIGRAM(S): at 21:48

## 2020-10-16 RX ADMIN — Medication 5 MILLIGRAM(S): at 20:55

## 2020-10-16 RX ADMIN — LIDOCAINE 1 PATCH: 4 CREAM TOPICAL at 20:55

## 2020-10-16 RX ADMIN — Medication 60 MILLIGRAM(S): at 20:55

## 2020-10-16 NOTE — ED PROVIDER NOTE - NSFOLLOWUPINSTRUCTIONS_ED_ALL_ED_FT
Sciatica    WHAT YOU NEED TO KNOW:    Sciatica is a condition that causes pain along your sciatic nerve. The sciatic nerve runs from your spine through both sides of your buttocks. It then runs down the back of your thigh, into your lower leg and foot. Your sciatic nerve may be compressed, inflamed, irritated, or stretched.          DISCHARGE INSTRUCTIONS:    Medicines:     NSAIDs: These medicines decrease swelling and pain. NSAIDs are available without a doctor's order. Ask your healthcare provider which medicine is right for you. Ask how much to take and when to take it. Take as directed. NSAIDs can cause stomach bleeding or kidney problems if not taken correctly.      Acetaminophen: This medicine decreases pain. Acetaminophen is available without a doctor's order. Ask how much to take and when to take it. Follow directions. Acetaminophen can cause liver damage if not taken correctly.      Muscle relaxers help decrease pain and muscle spasms.      Take your medicine as directed. Contact your healthcare provider if you think your medicine is not helping or if you have side effects. Tell him of her if you are allergic to any medicine. Keep a list of the medicines, vitamins, and herbs you take. Include the amounts, and when and why you take them. Bring the list or the pill bottles to follow-up visits. Carry your medicine list with you in case of an emergency.    Follow up with your healthcare provider as directed: Write down your questions so you remember to ask them during your visits.     Manage your symptoms:     Activity: Decrease your activity. Do not lift heavy objects or twist your back for at least 6 weeks. Slowly return to your usual activity.      Ice: Ice helps decrease swelling and pain. Ice may also help prevent tissue damage. Use an ice pack, or put crushed ice in a plastic bag. Cover it with a towel and place it on your low back or leg for 15 to 20 minutes every hour or as directed.      Heat: Heat helps decrease pain and muscle spasms. Apply heat on the area for 20 to 30 minutes every 2 hours for as many days as directed.       Physical therapy: You may need to see physical therapist to teach you exercises to help improve movement and strength, and to decrease pain. An occupational therapist teaches you skills to help with your daily activities.       Use assistive devices if directed: You may need to wear back support, such as a back brace. You may need crutches, a cane, or a walker to decrease stress on your lower back and leg muscles. Ask your healthcare provider for more information about assistive devices and how to use them correctly.    Self-care:     Avoid pressure on your back and legs: Do not lift heavy objects, or stand or sit for long periods of time.      Lift objects safely: Keep your back straight and bend your knees when you  an object. Do not bend or twist your back when you lift.      Maintain a healthy weight: Ask your healthcare provider how much you should weigh. Ask him to help you create a weight loss plan if you are overweight.       Exercise: Ask your healthcare provider about the best stretching, warmup, and exercise plan for you.     Contact your healthcare provider if:     You have pain in your lower back at night or when resting.      You have pain in your lower back with numbness below the knee.      You have weakness in one leg only.      You have questions or concerns about your condition or care.    Return to the emergency department if:     You have trouble holding back your urine or bowel movements.      You have weakness in both legs.      You have numbness in your groin or buttocks. Take naproxen and robaxin as needed for pain. Do not combine with your other muscle relaxant and anti-inflammatory. Take one or the other. Do not drive while taking muscle relaxant.     Sciatica    WHAT YOU NEED TO KNOW:    Sciatica is a condition that causes pain along your sciatic nerve. The sciatic nerve runs from your spine through both sides of your buttocks. It then runs down the back of your thigh, into your lower leg and foot. Your sciatic nerve may be compressed, inflamed, irritated, or stretched.          DISCHARGE INSTRUCTIONS:    Medicines:     NSAIDs: These medicines decrease swelling and pain. NSAIDs are available without a doctor's order. Ask your healthcare provider which medicine is right for you. Ask how much to take and when to take it. Take as directed. NSAIDs can cause stomach bleeding or kidney problems if not taken correctly.      Acetaminophen: This medicine decreases pain. Acetaminophen is available without a doctor's order. Ask how much to take and when to take it. Follow directions. Acetaminophen can cause liver damage if not taken correctly.      Muscle relaxers help decrease pain and muscle spasms.      Take your medicine as directed. Contact your healthcare provider if you think your medicine is not helping or if you have side effects. Tell him of her if you are allergic to any medicine. Keep a list of the medicines, vitamins, and herbs you take. Include the amounts, and when and why you take them. Bring the list or the pill bottles to follow-up visits. Carry your medicine list with you in case of an emergency.    Follow up with your healthcare provider as directed: Write down your questions so you remember to ask them during your visits.     Manage your symptoms:     Activity: Decrease your activity. Do not lift heavy objects or twist your back for at least 6 weeks. Slowly return to your usual activity.      Ice: Ice helps decrease swelling and pain. Ice may also help prevent tissue damage. Use an ice pack, or put crushed ice in a plastic bag. Cover it with a towel and place it on your low back or leg for 15 to 20 minutes every hour or as directed.      Heat: Heat helps decrease pain and muscle spasms. Apply heat on the area for 20 to 30 minutes every 2 hours for as many days as directed.       Physical therapy: You may need to see physical therapist to teach you exercises to help improve movement and strength, and to decrease pain. An occupational therapist teaches you skills to help with your daily activities.       Use assistive devices if directed: You may need to wear back support, such as a back brace. You may need crutches, a cane, or a walker to decrease stress on your lower back and leg muscles. Ask your healthcare provider for more information about assistive devices and how to use them correctly.    Self-care:     Avoid pressure on your back and legs: Do not lift heavy objects, or stand or sit for long periods of time.      Lift objects safely: Keep your back straight and bend your knees when you  an object. Do not bend or twist your back when you lift.      Maintain a healthy weight: Ask your healthcare provider how much you should weigh. Ask him to help you create a weight loss plan if you are overweight.       Exercise: Ask your healthcare provider about the best stretching, warmup, and exercise plan for you.     Contact your healthcare provider if:     You have pain in your lower back at night or when resting.      You have pain in your lower back with numbness below the knee.      You have weakness in one leg only.      You have questions or concerns about your condition or care.    Return to the emergency department if:     You have trouble holding back your urine or bowel movements.      You have weakness in both legs.      You have numbness in your groin or buttocks.

## 2020-10-16 NOTE — ED ADULT TRIAGE NOTE - PATIENT ON (OXYGEN DELIVERY METHOD)
CHIEF COMPLAINT:    Home visit    SUBJECTIVE:    Deborah Chang is a 49 year old female with critical cervical stenosis who was visited at home.    She was recently found to have critical cervical stenosis after progressive symptoms of weakness/numbness in legs and new symptoms in arms of numbness. Pt lives on the second floor of a duplex and manages her own medications and does well with her system. She lives with her  and dog, and has many stairs to navigate, inside and outside. She does have a spacious bathroom with shower chair. She also has a walker.    Deborah is in good spirits despite her weakness and prognosis with critical cervical stenosis. We discussed therapy for stress surrounding recent diagnosis and pt knows to call to make appointment as needed for behavioral health.     She continues to have right upper tooth pain. She denies new swelling, erythema, drainage, or fevers. She has been having a lot of trouble getting into see a dentist due to her insurance.    Deborah is willing to get her mammogram and colonoscopy done.      Review of systems:    As per HPI.     PROBLEM LIST:    Patient Active Problem List   Diagnosis   • Benign essential hypertension   • CAD (coronary artery disease)   • Hyperlipidemia   • Colitis, ischemic NOS   • Lumbago   • Overweight(278.02)   • Tobacco use disorder   • Lumbar radiculopathy   • SI (sacroiliac) joint dysfunction   • Encounter for long-term (current) use of other medications   • Chronic pain   • Abdominal pain, unspecified site   • Knee pain, left   • Degeneration of lumbar or lumbosacral intervertebral disc   • Greater trochanteric bursitis   • Right foot pain   • Pain medication agreement   • Marijuana abuse   • At risk for abuse of opiates   • Pain medication agreement broken   • Abnormal uterine bleeding   • Paresthesia of left lower extremity   • Hyperreflexia   • Left leg pain   • Chronic low back pain   • Intervertebral cervical disc disorder with myelopathy,  cervical region   • Degenerative cervical spinal stenosis        MEDICATIONS:      Current Outpatient Prescriptions on File Prior to Visit:  nalTREXone (REVIA) 50 MG tablet   buprenorphine (BUTRANS) 10 MCG/HR patch   LYRICA 150 MG capsule   HYDROcodone-acetaminophen (NORCO) 7.5-325 MG per tablet   diclofenac (SOLARAZE) 3 % gel   DULoxetine (CYMBALTA) 60 MG capsule   topiramate (TOPAMAX) 50 MG tablet   metoPROLOL (TOPROL-XL) 50 MG 24 hr tablet   Q-PAP EXTRA STRENGTH 500 MG tablet   atorvastatin (LIPITOR) 80 MG tablet   clopidogrel (PLAVIX) 75 MG tablet   polyethylene glycol (MIRALAX) powder   docusate sodium (COLACE) 100 MG capsule   nicotine polacrilex (NICORETTE) 2 MG gum   aspirin 325 MG tablet   diclofenac (VOLTAREN) 1 % gel   cyclobenzaprine (FLEXERIL) 10 MG tablet     No current facility-administered medications on file prior to visit.     PAST HISTORIES:     I have reviewed the past medical history, family history, social history, medications and allergies listed in the medical record as obtained by my nursing staff and support staff and agree with their documentation.    OBJECTIVE:    PHYSICAL EXAM:   Vital Signs:    Visit Vitals   • /60   • Pulse 67   • Temp 98.6 °F (37 °C)   • Wt 115.2 kg   • LMP 11/16/2016 (Approximate)   • BMI 39.78 kg/m2     General: Alert, cooperative, conversive in no acute distress  Head:  Normocephalic atraumatic.   Neck:  Trachea is midline.     Eyes:  Normal conjunctivae and sclerae.   ENT:  Mucous membranes are moist. Caries right upper side. No drainage, abscess or swelling. No cervical lymphadenopathy.  Cardiovascular:  Regular rate and rhythm without murmur.  Respiratory:  Normal respiratory effort.  Clear to auscultation.  No wheezes, rales or rhonchi.  Musculoskeletal:  No deformity or edema.   Skin:  Warm and dry without rash.  Some bruises on arms.  Neurologic:  Oriented x3.    Psychiatric:  Cooperative.  Appropriate mood and affect.      ASSESSMENT AND PLAN:    Diagnoses and all orders for this visit:    Visit for screening mammogram-no history of breast cancer in the family  -pt willing to get her mammogram    History of mesenteric infarction-pt had ischemic colitis and stents placed in 2009. No history of colon cancer in the family.  -she never had a follow up appt with GI and no colonoscopy since  -service to GI    Dental caries-pt told to call if develops swelling or fever for another round of clindamycin  -otherwise stressed she needs to get in to see a dentist, recommended trying [facility]    Other Health Maintenance:  Called pt and reminded her she still needs follow up biopsy due to history of complex endometrial hyperplasia without atypia on biopsy March 2016  -pt was lost to follow up at that time with loss of insurance and had opted for monitoring, and when returned in November considered progestin therapy to help prevent progression to carcinoma but was undergoing work up for weakness/cervical stenosis and didn't have time for the gyn appointment at that time.  -Reminded pt of plan and importance of re biopsying since it has been over 6 months since last biopsy and typically biopsies are repeated every 3-6 months to ensure the hyperplasia is not progressing, and reminded pt of other option of adding progestin therapy to help prevent progression which she was interested in last November  -We discussed referring pt again at next visit. Pt says she would like to think about it.  Health Maintenance Due   Topic Date Due   • Colorectal Cancer Screening-Colonoscopy  04/20/2016   • Breast Cancer Screening  02/02/2017     Follow up in clinic in 3 months.   room air

## 2020-10-16 NOTE — ED PROVIDER NOTE - CARE PROVIDER_API CALL
Lb Gutierrez  ORTHOPEDICS  [address] [address]  Phone: [phone]  Fax: [phone]  Follow Up Time:

## 2020-10-16 NOTE — ED ADULT NURSE NOTE - OBJECTIVE STATEMENT
Pt is a 51 y/o female A&Ox4 in NAD ambulatory with steady gait c/o lower back pain radiating down left leg. Pt reports hx of chronic back pain. Pt denies fall/injury, bowel/bladder incontinence.

## 2020-10-16 NOTE — ED PROVIDER NOTE - PHYSICAL EXAMINATION
CONSTITUTIONAL: Obese; Well-appearing; well-nourished; in no apparent distress.   HEAD: Normocephalic; atraumatic.   NECK: Supple; non-tender;   CARDIOVASCULAR: Normal S1, S2; no murmurs, rubs, or gallops. Regular rate and rhythm.   RESPIRATORY: Breathing easily; breath sounds clear and equal bilaterally; no wheezes, rhonchi, or rales.  MSK: FROM at all extremities,   Back: +ttp to L lumbar paraspinal muscles and buttocks, neg SLR  Neuro: CN 2-12 intact, normal finger to nose, normal gait, strength normal

## 2020-10-16 NOTE — ED ADULT NURSE NOTE - NSSUHOSCREENINGYN_ED_ALL_ED
-- DO NOT REPLY / DO NOT REPLY ALL --  -- Message is from the [facility]--      Patient is requesting a medication refill - medication is on active list    Was Medication Pended? Yes.    Rx Name and Dose:      methylpheniDATE (RITALIN) 5 MG tablet      Duration: 30 days    Pharmacy  [id number] - [place], [place] - [address] [facility] [address]    Patient confirmed the above pharmacy as correct?  Yes    Caller Information       Type Contact Phone    03/04/2020 08:11 AM Phone (Incoming) ABBY VARELA (Mother) [phone]          Alternative phone number:     Na     Turnaround time given to caller:   \"This message will be sent to [state Provider's name]. The clinical team will fulfill your request as soon as they review your message.\"   Yes - the patient is able to be screened

## 2020-10-16 NOTE — ED PROVIDER NOTE - CLINICAL SUMMARY MEDICAL DECISION MAKING FREE TEXT BOX
51 yo F with pmh of chronic back pain (L5/S1 disc herniation),asthma, bipolar c/o worsening L lower back pain x 1 week. Pain radiates down the back of her left leg in a shooting pain. Pt take flexeril and an antiinflammatory with minimal relief. Pt has followed with ortho and referred to PT but pain has been more severe over the past week. Denies new trauma. Denies numbness, tingling, incontinence, abd pain, hematuria.  +ttp to L lumbar paraspinal muscles and buttocks, neg SLR. Will treat pain, no indication for imaging at this time. Will f/u with her orthopedist.

## 2020-10-16 NOTE — ED PROVIDER NOTE - PATIENT PORTAL LINK FT
You can access the FollowMyHealth Patient Portal offered by [facility] by registering at the following website: [email or web address]. By joining [facility]’s FollowMyHealth portal, you will also be able to view your health information using other applications (apps) compatible with our system.

## 2020-10-16 NOTE — ED PROVIDER NOTE - OBJECTIVE STATEMENT
49 yo F with pmh of chronic back pain (L5/S1 disc herniation),asthma, bipolar c/o worsening L lower back pain x 1 week. Pain radiates down the back of her left leg in a shooting pain. Pt take flexeril and an antiinflammatory with minimal relief. Pt has followed with ortho and referred to PT but pain has been more severe over the past week. Denies new trauma. Denies numbness, tingling, incontinence, abd pain, hematuria.

## 2020-10-16 NOTE — ED ADULT NURSE NOTE - NSSEPSISSUSPECTED_ED_A_ED
Mario Sauer), Pediatrics  [address] [facility] [address]  [address] [place] [address]  Phone: [phone]  Fax: [phone]
No

## 2020-10-20 DIAGNOSIS — M54.5 LOW BACK PAIN: ICD-10-CM

## 2020-10-20 DIAGNOSIS — M54.41 LUMBAGO WITH SCIATICA, RIGHT SIDE: ICD-10-CM

## 2021-02-02 ENCOUNTER — APPOINTMENT (OUTPATIENT)
Dept: ORTHOPEDIC SURGERY | Facility: CLINIC | Age: 51
End: 2021-02-02
Payer: COMMERCIAL

## 2021-02-02 VITALS
OXYGEN SATURATION: 98 % | HEART RATE: 97 BPM | TEMPERATURE: 97.5 F | SYSTOLIC BLOOD PRESSURE: 148 MMHG | BODY MASS INDEX: 46.07 KG/M2 | DIASTOLIC BLOOD PRESSURE: 100 MMHG | HEIGHT: 63 IN | WEIGHT: 260 LBS

## 2021-02-02 PROCEDURE — 99072 ADDL SUPL MATRL&STAF TM PHE: CPT

## 2021-02-02 PROCEDURE — 99212 OFFICE O/P EST SF 10 MIN: CPT | Mod: 25

## 2021-02-02 PROCEDURE — 20611 DRAIN/INJ JOINT/BURSA W/US: CPT | Mod: RT

## 2021-02-02 NOTE — PROCEDURE
[de-identified] : Ultrasound-Guided RIGHT Knee Arthrocentesis\par \par Indication for U/S Guidance: Ensure placement within the tibiofemoral joint for diagnostic purposes, while avoiding neurovascular structures\par \par Indication for Injection: KNEE EFFUSION, OSTEOARTHRITIS\par \par A discussion was had with the patient regarding this procedure and all questions were answered. All risks, benefits and alternatives were discussed. These included but were not limited to bleeding, infection, and allergic reaction. A timeout was done to ensure correct side and pt agreed to the procedure. Betadine was used to sterilize and prep the area, and alcohol was used to clean the skin in the anterior aspect of the knee joint. The suprapatellar space was visualized utilizing the Sonosite, linear transducer. The joint was visualized in the short axis and an in-plane approach was used for the injection. Ultrasound guidance was utilized to ensure accuracy of the intra-articular aspiration, and avoid the neurovascular structures. A 25-gauge 1.5" needle was used to inject 4cc of 1% lidocaine without epi into the SubQ.  This was followed by aspiration with an 18-gauge 1.5" needle with aspiration of 30cc of nonupurulent, yellow synovial fluid.  This was followed by 1cc of 1% lidocaine without epinephrine, 1cc of 0.5%bupivicaine and 1cc of KENALOG. . A sterile bandage was then applied. The patient tolerated the procedure well and there were no complications.\par

## 2021-02-02 NOTE — DISCUSSION/SUMMARY
[Medication Risks Reviewed] : Medication risks reviewed [de-identified] : The patient is a 49 year old woman presenting with a several week history of acute on chronic right knee pain likely secondary to knee osteoarthritis.\par \par I discussed the treatment of degenerative arthritis with the patient at length today. I described the spectrum of treatment from nonoperative modalities to total joint arthroplasty. Noninvasive and nonoperative treatment modalities include weight reduction, activity modification with low impact exercise, PRN use of acetaminophen or anti-inflammatory medication if tolerated, natural supplements such as glucosamine/chondroitin, and physical therapy. Further treatments can include corticosteroid injection, hyaluronic acid injections, and orthobiologic such as PRP. Definitive treatment can certainly include total joint arthroplasty, but patient would require surgical consultation to discuss that option further. The risks and benefits of each treatment options was discussed and all questions were answered.\par \par We discussed the importance of weight loss.  Patient understands that she may require surgery in the future.\par \par After informed consent, and explanation of risks, benefits, alternatives, adverse effects of injection, which includes but is not limited to infection, bleeding, allergic reaction, swelling, soft tissue weakening/tendon rupture, injection site complication, fat atrophy, skin depigmentation, failure to improve symptoms, the patient would like to proceed with the procedure - RIGHT KNEE ULTRASOUND-GUIDED ARTHROCENTESIS AND CORTICOSTEROID INJECTION. See procedure note above. Patient tolerated the procedure well. The patient was provided with postinjection instructions.\par \par Patient would like to think about HA injections\par \par Follow-up in 2-3 months.\par ------------------------------------------------------------------------------------------------------------------\par Patient appreciates and agrees with current plan.\par \par This note was generated using a mixture of manual typing and dragon medical dictation software.  A reasonable effort has been made for proofreading its contents, but typos may still remain.  If there are any questions or points of clarification needed please notify my office.\par \par >15 minutes of time was spent on total encounter.  >50% of the visit was spent on counseling/coordination of care and medical-decision making for this patient.\par

## 2021-02-02 NOTE — PHYSICAL EXAM
[de-identified] : General: Well-nourished, well-developed, alert, and in no acute distress.\par Head: Normocephalic.\par Eyes: Pupils equal round reactive to light and accommodation, extraocular muscles intact, normal sclera.\par Nose: No nasal discharge.\par Cardiac: Regular rate. Extremities are warm and well perfused. Distal pulses are symmetric bilaterally.\par Respiratory: No labored breathing.\par Extremities: Sensation is intact distally bilaterally.  Distal pulses are symmetric bilaterally\par Lymphatic: No regional lymphadenopathy, no lymphedema\par Neurologic: No focal deficits\par Skin: Normal skin color, texture, and turgor\par Psychiatric: Normal affect\par MSK: as noted above/below\par \par LEFT KNEE:\par \par Inspection: no bruising, swelling, erythema\par Joint Effusion:no \par ROM: Knee Flexion 130 , Knee Extension 0\par Palpation:No pain at joint line, patellar tendon, MFC/LFC, Medial/Lateral Tibial Plateau\par Leg Length Discrepancy:no\par Patella: no apprehension\par Distal Pulses: normal\par Lower Extremity Strength:normal, 5/5 \par Lower Extremity Reflexes:normal, 2+\par Lower Extremity Sensation: normal\par \par Special Tests:\par Jenniffer:Negative \par Abdi: Negative\par Anterior Drawer:Negative\par Posterior Drawer:Negative \par Varus/Valgus:Negative, no instability\par \par RIGHT KNEE:\par \par Inspection: no bruising, erythema\par Joint Effusion:MODERATE\par ROM: Knee Flexion ~125 WITH PAIN, Knee Extension 0\par Palpation:MEDIAL>LATERAL JOINT LINE PAIN, PATELLAR FACET TENDERNESS, No pain at  patellar tendon, MFC/LFC, Medial/Lateral Tibial Plateau\par Leg Length Discrepancy:no\par Patella: MILD PAIN WITH COMPRESSION\par Distal Pulses: normal\par Lower Extremity Strength:5-/5\par Lower Extremity Reflexes:normal, 2+\par Lower Extremity Sensation: normal\par \par Special Tests:\par [facility]:PAINFUL\par Anterior Drawer:Negative\par Posterior Drawer:Negative \par Varus/Valgus:Negative, no instability\par \par

## 2021-02-02 NOTE — HISTORY OF PRESENT ILLNESS
[de-identified] : The patient is a 49 year old woman presenting for follow-up for right knee pain\par \par The patient was last seen about 4 months ago for left knee osteoarthritis.  She has a history of left-sided lumbar radiculopathy and has  been seeing pain management for epidural injections.\par \par She complains of a several week history of progressive, atraumatic right knee pain, swelling and stiffness.  She has had previous pain episodes in the past.  She endorses swelling.  She has been having pain with stair use.  She had previous xray imaging in 2017 confirming knee OA.  Pain is different in character compared to lumbar issues.\par \par Pain is moderate in intensity, described as achy, improved with rest, worse with walking.\par \par \par

## 2021-02-22 RX ORDER — NAPROXEN 375 MG/1
375 TABLET ORAL
Qty: 60 | Refills: 1 | Status: ACTIVE | COMMUNITY
Start: 2021-02-22 | End: 1900-01-01

## 2021-02-22 RX ORDER — HYALURONATE SOD, CROSS-LINKED 30 MG/3 ML
30 SYRINGE (ML) INTRAARTICULAR
Qty: 2 | Refills: 0 | Status: ACTIVE | COMMUNITY
Start: 2021-02-22

## 2021-03-04 ENCOUNTER — APPOINTMENT (OUTPATIENT)
Dept: ORTHOPEDIC SURGERY | Facility: CLINIC | Age: 51
End: 2021-03-04
Payer: COMMERCIAL

## 2021-03-04 VITALS — TEMPERATURE: 97.2 F

## 2021-03-04 DIAGNOSIS — M17.12 UNILATERAL PRIMARY OSTEOARTHRITIS, LEFT KNEE: ICD-10-CM

## 2021-03-04 PROCEDURE — 99072 ADDL SUPL MATRL&STAF TM PHE: CPT

## 2021-03-04 PROCEDURE — 20611 DRAIN/INJ JOINT/BURSA W/US: CPT | Mod: RT

## 2021-03-04 PROCEDURE — 20611 DRAIN/INJ JOINT/BURSA W/US: CPT | Mod: LT

## 2021-03-04 NOTE — PROCEDURE
[de-identified] : Ultrasound-Guided RIGHT Knee Arthrocentesis\par \par Indication for U/S Guidance: Ensure placement within the tibiofemoral joint for diagnostic purposes, while avoiding neurovascular structures\par \par Indication for Injection:  OSTEOARTHRITIS\par \par A discussion was had with the patient regarding this procedure and all questions were answered. All risks, benefits and alternatives were discussed. These included but were not limited to bleeding, infection, and allergic reaction. A timeout was done to ensure correct side and pt agreed to the procedure. Betadine was used to sterilize and prep the area, and alcohol was used to clean the skin in the anterior aspect of the knee joint. The suprapatellar space was visualized utilizing the Sonosite, linear transducer. The joint was visualized in the short axis and an in-plane approach was used for the injection. Ultrasound guidance was utilized to ensure accuracy of the intra-articular aspiration, and avoid the neurovascular structures. A 25-gauge 1.5" needle was used to inject 2cc of 1% lidocaine without epi into the SubQ.  This was followed by 3cc of GEL-ONE . A sterile bandage was then applied. The patient tolerated the procedure well and there were no complications.\par \par LOT: [id number]\par Exp:2022-12-06\par \par Ultrasound-Guided LEFT Knee Arthrocentesis\par \par Indication for U/S Guidance: Ensure placement within the tibiofemoral joint for diagnostic purposes, while avoiding neurovascular structures\par \par Indication for Injection:  OSTEOARTHRITIS\par \par A discussion was had with the patient regarding this procedure and all questions were answered. All risks, benefits and alternatives were discussed. These included but were not limited to bleeding, infection, and allergic reaction. A timeout was done to ensure correct side and pt agreed to the procedure. Betadine was used to sterilize and prep the area, and alcohol was used to clean the skin in the anterior aspect of the knee joint. The suprapatellar space was visualized utilizing the Sonosite, linear transducer. The joint was visualized in the short axis and an in-plane approach was used for the injection. Ultrasound guidance was utilized to ensure accuracy of the intra-articular aspiration, and avoid the neurovascular structures. A 25-gauge 1.5" needle was used to inject 2cc of 1% lidocaine without epi into the SubQ.  This was followed by 3cc of GEL-ONE . A sterile bandage was then applied. The patient tolerated the procedure well and there were no complications.\par \par \par LOT: [id number]\par Exp:2022-12-06\par

## 2021-07-19 ENCOUNTER — APPOINTMENT (OUTPATIENT)
Dept: ORTHOPEDIC SURGERY | Facility: CLINIC | Age: 51
End: 2021-07-19
Payer: COMMERCIAL

## 2021-07-19 VITALS
HEIGHT: 63 IN | HEART RATE: 83 BPM | DIASTOLIC BLOOD PRESSURE: 81 MMHG | OXYGEN SATURATION: 98 % | TEMPERATURE: 98.6 F | SYSTOLIC BLOOD PRESSURE: 140 MMHG

## 2021-07-19 DIAGNOSIS — M17.11 UNILATERAL PRIMARY OSTEOARTHRITIS, RIGHT KNEE: ICD-10-CM

## 2021-07-19 PROCEDURE — 99213 OFFICE O/P EST LOW 20 MIN: CPT | Mod: 25

## 2021-07-19 PROCEDURE — 20611 DRAIN/INJ JOINT/BURSA W/US: CPT | Mod: RT

## 2021-07-19 RX ORDER — NAPROXEN 500 MG/1
500 TABLET ORAL
Qty: 20 | Refills: 0 | Status: ACTIVE | COMMUNITY
Start: 2021-04-29

## 2021-07-19 RX ORDER — HYDROCHLOROTHIAZIDE 25 MG/1
25 TABLET ORAL
Qty: 90 | Refills: 0 | Status: ACTIVE | COMMUNITY
Start: 2021-05-28

## 2021-07-19 RX ORDER — KETOCONAZOLE 20 MG/G
2 CREAM TOPICAL
Qty: 60 | Refills: 0 | Status: ACTIVE | COMMUNITY
Start: 2021-03-23

## 2021-07-19 RX ORDER — HYDROCODONE BITARTRATE AND ACETAMINOPHEN 10; 325 MG/1; MG/1
10-325 TABLET ORAL
Qty: 75 | Refills: 0 | Status: ACTIVE | COMMUNITY
Start: 2021-03-04

## 2021-07-19 RX ORDER — IBUPROFEN 600 MG/1
600 TABLET, FILM COATED ORAL
Qty: 60 | Refills: 0 | Status: ACTIVE | COMMUNITY
Start: 2021-03-03

## 2021-07-19 RX ORDER — HYDROCODONE BITARTRATE AND ACETAMINOPHEN 5; 325 MG/1; MG/1
5-325 TABLET ORAL
Qty: 75 | Refills: 0 | Status: ACTIVE | COMMUNITY
Start: 2021-07-13

## 2021-07-19 RX ORDER — METOPROLOL TARTRATE 25 MG/1
25 TABLET, FILM COATED ORAL
Qty: 180 | Refills: 0 | Status: ACTIVE | COMMUNITY
Start: 2021-03-22

## 2021-07-19 RX ORDER — PREGABALIN 150 MG/1
150 CAPSULE ORAL
Qty: 60 | Refills: 0 | Status: ACTIVE | COMMUNITY
Start: 2021-07-13

## 2021-07-19 RX ORDER — MELOXICAM 15 MG/1
15 TABLET ORAL
Qty: 30 | Refills: 0 | Status: ACTIVE | COMMUNITY
Start: 2021-03-23

## 2021-07-19 RX ORDER — HYDROCHLOROTHIAZIDE 12.5 MG/1
12.5 CAPSULE ORAL
Qty: 180 | Refills: 0 | Status: ACTIVE | COMMUNITY
Start: 2021-03-22

## 2021-07-19 NOTE — PHYSICAL EXAM
[de-identified] : General: Well-nourished, well-developed, alert, and in no acute distress.\par Head: Normocephalic.\par Eyes: Pupils equal round reactive to light and accommodation, extraocular muscles intact, normal sclera.\par Nose: No nasal discharge.\par Cardiac: Regular rate. Extremities are warm and well perfused. Distal pulses are symmetric bilaterally.\par Respiratory: No labored breathing.\par Extremities: Sensation is intact distally bilaterally.  Distal pulses are symmetric bilaterally\par Lymphatic: No regional lymphadenopathy, no lymphedema\par Neurologic: No focal deficits\par Skin: Normal skin color, texture, and turgor\par Psychiatric: Normal affect\par MSK: as noted above/below\par \par LEFT KNEE:\par \par Inspection: no bruising, swelling, erythema\par Joint Effusion:no \par ROM: Knee Flexion 130 , Knee Extension 0\par Palpation:No pain at joint line, patellar tendon, MFC/LFC, Medial/Lateral Tibial Plateau\par Leg Length Discrepancy:no\par Patella: no apprehension\par Distal Pulses: normal\par Lower Extremity Strength:normal, 5/5 \par Lower Extremity Reflexes:normal, 2+\par Lower Extremity Sensation: normal\par \par Special Tests:\par Jenniffer:Negative \par Abdi: Negative\par Anterior Drawer:Negative\par Posterior Drawer:Negative \par Varus/Valgus:Negative, no instability\par \par RIGHT KNEE:\par \par Inspection: no bruising, erythema\par Joint Effusion:MODERATE\par ROM: Knee Flexion ~125 WITH PAIN, Knee Extension 0\par Palpation:MEDIAL>LATERAL JOINT LINE PAIN, PATELLAR FACET TENDERNESS, No pain at  patellar tendon, MFC/LFC, Medial/Lateral Tibial Plateau\par Leg Length Discrepancy:no\par Patella: MILD PAIN WITH COMPRESSION\par Distal Pulses: normal\par Lower Extremity Strength:5-/5\par Lower Extremity Reflexes:normal, 2+\par Lower Extremity Sensation: normal\par \par Special Tests:\par [facility]:PAINFUL\par Anterior Drawer:Negative\par Posterior Drawer:Negative \par Varus/Valgus:Negative, no instability\par \par

## 2021-07-19 NOTE — DISCUSSION/SUMMARY
[Medication Risks Reviewed] : Medication risks reviewed [de-identified] : The patient is a 49 year old woman presenting with chronic right knee pain likely secondary to knee osteoarthritis.\par \par We again discussed natural progression of arthritis, as well as nonoperative and operative treatment options.\par \par After informed consent, and explanation of risks, benefits, alternatives, adverse effects of injection, which includes but is not limited to infection, bleeding, allergic reaction, swelling, soft tissue weakening/tendon rupture, injection site complication, fat atrophy, skin depigmentation, failure to improve symptoms, the patient would like to proceed with the procedure - RIGHT KNEE ULTRASOUND-GUIDED CORTICOSTEROID INJECTIONS. See procedure note above. Patient tolerated the procedure well. The patient was provided with postinjection instructions.\par \par The patient was also provided some general home exercises.  The patient was counseled on activity modification.\par \par Follow-up in 3 months as needed. Repeat standing bilateral xrays at next visit.\par \par \par ------------------------------------------------------------------------------------------------------------------\par Patient appreciates and agrees with current plan.\par \par This note was generated using a mixture of manual typing and dragon medical dictation software.  A reasonable effort has been made for proofreading its contents, but typos may still remain.  If there are any questions or points of clarification needed please notify my office.\par \par >15 minutes of time was spent on total encounter.  >50% of the visit was spent on counseling/coordination of care and medical-decision making for this patient.\par

## 2021-07-19 NOTE — PROCEDURE
[de-identified] : Ultrasound-Guided RIGHT Knee Arthrocentesis\par \par Indication for U/S Guidance: Ensure placement within the tibiofemoral joint for diagnostic purposes, while avoiding neurovascular structures\par \par Indication for Injection: KNEE EFFUSION, OSTEOARTHRITIS\par \par A discussion was had with the patient regarding this procedure and all questions were answered. All risks, benefits and alternatives were discussed. These included but were not limited to bleeding, infection, and allergic reaction. A timeout was done to ensure correct side and pt agreed to the procedure. Betadine was used to sterilize and prep the area, and alcohol was used to clean the skin in the anterior aspect of the knee joint. The suprapatellar space was visualized utilizing the Sonosite, linear transducer. The joint was visualized in the short axis and an in-plane approach was used for the injection. Ultrasound guidance was utilized to ensure accuracy of the intra-articular aspiration, and avoid the neurovascular structures. A 25-gauge 1.5" needle was used to inject 4cc of 1% lidocaine without epi into the SubQ. This was followed by aspiration with an 18-gauge 1.5" needle with aspiration of 10cc of nonupurulent, yellow synovial fluid. This was followed by 1cc of 1% lidocaine without epinephrine, 1cc of 0.5%bupivicaine and 1cc of KENALOG.. A sterile bandage was then applied. The patient tolerated the procedure well and there were no complications.

## 2021-07-19 NOTE — HISTORY OF PRESENT ILLNESS
[de-identified] : The patient is a 49 year old woman presenting for follow-up for right knee pain\par \par The patient was last seen about 4 months ago for left knee osteoarthritis.  She has a history of left-sided lumbar radiculopathy and has  been seeing pain management for epidural injections.\par \par Pain had been different in character compared to lumbar issues.\par \par At her last visit 4 months ago, she had gel-one injection.  Cortisone injection prior to that.  Pain has recurred.  The patient denies mechanical symptoms including catching, locking, buckling.  She is planning to have spine surgery this October, but has to lose significant weight.\par \par \par \par

## 2021-09-19 NOTE — ED ADULT NURSE NOTE - HIV OFFER
You were evaluated in the ED for left foot pain after an injury. You received pain medication and your x-rays showed no fracture. Treatment of your ankle and foot should include the followin) Rest the foot; minimize time spent walking on it  2) Apply cold compresses or ice to the area to reduce the swelling  3) Wrap the foot in a compression bandage to reduce swelling  4) Elevate the foot as much as possible when not walking    Please follow up with your vascular surgeon at [place] and if needed orthopedics for further management. The number for orthopedics is provided in your paperwork.    SEEK IMMEDIATE MEDICAL CARE IF YOU HAVE ANY OF THE FOLLOWING SYMPTOMS: worsening pain, inability to move that body part, numbness or tingling, any other worrying symptoms    Sprain    A sprain is a stretch or tear in one of the tough, fiber-like tissues (ligaments) in your body. This is caused by an injury to the area such as a twisting mechanism. Symptoms include pain, swelling, or bruising. Rest that area over the next several days and slowly resume activity when tolerated. Ice can help with swelling and pain.
Previously Declined (within the last year)

## 2022-06-13 ENCOUNTER — EMERGENCY (EMERGENCY)
Facility: HOSPITAL | Age: 52
LOS: 1 days | Discharge: HOME | End: 2022-06-13
Admitting: EMERGENCY MEDICINE
Payer: COMMERCIAL

## 2022-06-13 VITALS
HEIGHT: 62 IN | HEART RATE: 95 BPM | WEIGHT: 259.93 LBS | TEMPERATURE: 98 F | OXYGEN SATURATION: 96 % | DIASTOLIC BLOOD PRESSURE: 85 MMHG | SYSTOLIC BLOOD PRESSURE: 124 MMHG | RESPIRATION RATE: 18 BRPM

## 2022-06-13 DIAGNOSIS — Z41.9 ENCOUNTER FOR PROCEDURE FOR PURPOSES OTHER THAN REMEDYING HEALTH STATE, UNSPECIFIED: Chronic | ICD-10-CM

## 2022-06-13 DIAGNOSIS — Z98.890 OTHER SPECIFIED POSTPROCEDURAL STATES: Chronic | ICD-10-CM

## 2022-06-13 PROCEDURE — 99284 EMERGENCY DEPT VISIT MOD MDM: CPT

## 2022-06-13 RX ORDER — KETOROLAC TROMETHAMINE 30 MG/ML
15 SYRINGE (ML) INJECTION ONCE
Refills: 0 | Status: DISCONTINUED | OUTPATIENT
Start: 2022-06-13 | End: 2022-06-13

## 2022-06-13 RX ADMIN — Medication 15 MILLIGRAM(S): at 21:22

## 2022-06-13 RX ADMIN — Medication 15 MILLIGRAM(S): at 20:53

## 2022-06-13 NOTE — ED ADULT TRIAGE NOTE - CHIEF COMPLAINT QUOTE
Pt walked in c/o knee pain to R knee. Hx of arthritis. States has been biking recently for 2 weeks. Denies trauma.

## 2022-06-13 NOTE — ED PROVIDER NOTE - NSICDXPASTMEDICALHX_GEN_ALL_CORE_FT
PAST MEDICAL HISTORY:  Asthma     Bipolar disorder     GERD (gastroesophageal reflux disease)     Intramural leiomyoma of uterus     Leg pain

## 2022-06-13 NOTE — ED PROVIDER NOTE - PATIENT PORTAL LINK FT
You can access the FollowMyHealth Patient Portal offered by [facility] by registering at the following website: [email or web address]. By joining [facility]’s FollowMyHealth portal, you will also be able to view your health information using other applications (apps) compatible with our system.

## 2022-06-13 NOTE — ED PROVIDER NOTE - OBJECTIVE STATEMENT
52 yo F, pmhx HTN and knee arthritis, presenting with exacerbation of her chronic R knee pain for the past few days. Pt states she has been biking lately as a way to become more healthy and lose weight. She notes pain w/ bending her knee. Pt has not taken anything at home for her symptoms. She denies falls, trauma, weakness, numbness, tingling, headaches, or dizziness.

## 2022-06-13 NOTE — ED PROVIDER NOTE - MUSCULOSKELETAL, MLM
+ttp along the anterior knee, crepitance noted; FROM of the knee; no overt swelling noted; normal gait

## 2022-06-13 NOTE — ED PROVIDER NOTE - CLINICAL SUMMARY MEDICAL DECISION MAKING FREE TEXT BOX
52 yo F, pmhx HTN and knee arthritis, presenting with exacerbation of her chronic R knee pain for the past few days. Patient reports her pain is an exacerbation since she has been biking more frequently. Will give IM toradol for pain relief w/ RX for naprosyn. Pt has her own ortho and plans to f/u w/ them. Pt stable on DC.

## 2022-06-13 NOTE — ED PROVIDER NOTE - NSFOLLOWUPINSTRUCTIONS_ED_ALL_ED_FT
Arthritis is a general term that means inflammation of the joints. There are dozens of types of arthritis. Osteoarthritis is the most common type. It often comes with age, and it often affects the hands, knees, and hips.  The place where 2 bones meet is normally covered with a rubbery material called cartilage. This material allows the bones to slide over each other without causing pain. When osteoarthritis sets in, the cartilage begins to break down. As it wears away, the bones in the joint start to rub against each other. This can cause pain, stiffness, and swelling.  The symptoms of osteoarthritis can be hard to handle. But don't lose hope. You might need to try different combinations of medicines, exercises, and devices to find the approach that works for you. But most people do find ways to go back to doing many of things they like to do.

## 2022-06-13 NOTE — ED PROVIDER NOTE - NSICDXPASTSURGICALHX_GEN_ALL_CORE_FT
PAST SURGICAL HISTORY:  Elective surgery lap band    Elective surgery embolization of fibroid    History of gastric bypass

## 2022-06-15 DIAGNOSIS — M19.90 UNSPECIFIED OSTEOARTHRITIS, UNSPECIFIED SITE: ICD-10-CM

## 2022-06-15 DIAGNOSIS — M17.11 UNILATERAL PRIMARY OSTEOARTHRITIS, RIGHT KNEE: ICD-10-CM

## 2022-06-15 DIAGNOSIS — J45.909 UNSPECIFIED ASTHMA, UNCOMPLICATED: ICD-10-CM

## 2022-06-15 DIAGNOSIS — M25.561 PAIN IN RIGHT KNEE: ICD-10-CM

## 2022-06-15 DIAGNOSIS — F31.9 BIPOLAR DISORDER, UNSPECIFIED: ICD-10-CM

## 2022-06-15 DIAGNOSIS — I10 ESSENTIAL (PRIMARY) HYPERTENSION: ICD-10-CM

## 2022-06-15 DIAGNOSIS — G89.29 OTHER CHRONIC PAIN: ICD-10-CM

## 2022-07-14 ENCOUNTER — APPOINTMENT (OUTPATIENT)
Dept: BARIATRICS | Facility: CLINIC | Age: 52
End: 2022-07-14

## 2022-07-14 VITALS
HEIGHT: 62 IN | SYSTOLIC BLOOD PRESSURE: 120 MMHG | DIASTOLIC BLOOD PRESSURE: 80 MMHG | OXYGEN SATURATION: 97 % | WEIGHT: 255 LBS | TEMPERATURE: 97.4 F | BODY MASS INDEX: 46.93 KG/M2 | HEART RATE: 105 BPM

## 2022-07-14 PROCEDURE — 99204 OFFICE O/P NEW MOD 45 MIN: CPT

## 2022-07-21 NOTE — HISTORY OF PRESENT ILLNESS
[de-identified] : Patient is a 51 year year old F  with a PMHx of obesity (BMI 46), HTN, hypercholesteremia and knee arthritis and PSHx of gastric bypass in 2007 and lap band reportedly in 2014 who presents today for initial bariatric consult. She has been following up with her PCP, Dr. Dunn. She is on medication for HTN and hypercholesteremia. She has acid reflux for which she is on Omeprazole. Reports that’s her acid reflux has been manageable on Omeprazole QD and is now taking Omeprazole as needed. She reports that she had the previous bariatric surgery with . She reports that her lowest weight was around 190 to 200 lbs and the insertion of lap band did not help her lose weight. Patient mentioned that her previous bariatric doctor has refused her from another aggressive bariatric surgery because she has a lap band placed on her gastric bypass and a conversion surgery can be invasive with her situation. \par

## 2022-07-21 NOTE — END OF VISIT
[Time Spent: ___ minutes] : I have spent [unfilled] minutes of time on the encounter. [FreeTextEntry3] : All medical record entries made by the Scribe were at my, KIM Pan , direction and personally dictated by me on 07/14/2022 . I have reviewed the chart and agree that the record accurately reflects my personal performance of the history, physical exam, assessment and plan. I have also personally directed, reviewed, and agreed with the chart.\par

## 2022-07-21 NOTE — ASSESSMENT
[FreeTextEntry1] : Patient is a 51 year year old F  with a PMHx of obesity (BMI 46), HTN, hypercholesteremia and knee arthritis and PSHx of gastric bypass in 2007 and lap band reportedly in 2014 who presents today for initial bariatric consult. Patient is doing well overall. She is here today to discuss the option for conversion surgery and has a lap band placement on gastric bypass previously. I have explained to her that I agree with her previous bariatric surgeon. In her condition, it might not be feasible to perform another bariatric surgery. Another bariatric surgery can be risky and aggressive on her as she might not have enough area on her stomach left to operate. Patient will get an EGD to evaluate if another bariatric surgery can be performed and to check the feasibility. Will see our coordinators to schedule an EGD and will follow up.

## 2022-08-25 ENCOUNTER — APPOINTMENT (OUTPATIENT)
Dept: ORTHOPEDIC SURGERY | Facility: CLINIC | Age: 52
End: 2022-08-25

## 2022-10-20 NOTE — ED ADULT NURSE NOTE - PMH
Quality 226: Preventive Care And Screening: Tobacco Use: Screening And Cessation Intervention: Patient screened for tobacco use and is an ex/non-smoker Quality 130: Documentation Of Current Medications In The Medical Record: Current Medications Documented Detail Level: Detailed Quality 431: Preventive Care And Screening: Unhealthy Alcohol Use - Screening: Patient not identified as an unhealthy alcohol user when screened for unhealthy alcohol use using a systematic screening method Asthma    Bipolar disorder    GERD (gastroesophageal reflux disease)    Intramural leiomyoma of uterus

## 2023-03-30 NOTE — ED ADULT NURSE NOTE - PT NEEDS ASSIST
Render Note In Bullet Format When Appropriate: No Post-Care Instructions: I reviewed with the patient in detail post-care instructions. Patient is to wear sunprotection, and avoid picking at any of the treated lesions. Pt may apply Vaseline to crusted or scabbing areas. Number Of Freeze-Thaw Cycles: 3 freeze-thaw cycles Consent: The patient's consent was obtained including but not limited to risks of crusting, scabbing, blistering, scarring, darker or lighter pigmentary change, recurrence, incomplete removal and infection. Show Aperture Variable?: Yes Detail Level: Zone Duration Of Freeze Thaw-Cycle (Seconds): 10 no Average build

## 2023-04-13 ENCOUNTER — APPOINTMENT (OUTPATIENT)
Dept: BARIATRICS | Facility: CLINIC | Age: 53
End: 2023-04-13
Payer: COMMERCIAL

## 2023-04-13 VITALS
SYSTOLIC BLOOD PRESSURE: 137 MMHG | WEIGHT: 265 LBS | TEMPERATURE: 97.6 F | DIASTOLIC BLOOD PRESSURE: 81 MMHG | HEIGHT: 62 IN | BODY MASS INDEX: 48.76 KG/M2 | OXYGEN SATURATION: 98 % | HEART RATE: 98 BPM

## 2023-04-13 PROCEDURE — 99215 OFFICE O/P EST HI 40 MIN: CPT

## 2023-04-13 NOTE — ED PROVIDER NOTE - PSYCHIATRIC, MLM
Alert and oriented to person, place, time/situation. normal mood and affect. VTAMA Counseling: I discussed with the patient that VTAMA is not for use in the eyes, mouth or mouth. They should call the office if they develop any signs of allergic reactions to VTAMA. The patient verbalized understanding of the proper use and possible adverse effects of VTAMA.  All of the patient's questions and concerns were addressed.

## 2023-04-14 NOTE — PLAN
[FreeTextEntry1] : - EGD Torr \par -start Mounjaro\par - Return for follow up after the completion of above

## 2023-04-14 NOTE — END OF VISIT
[Time Spent: ___ minutes] : I have spent [unfilled] minutes of time on the encounter. [FreeTextEntry3] : All medical record entries made by the Scribe were at my, KIM Pan , direction and personally dictated by me on 04/13/2023 . I have reviewed the chart and agree that the record accurately reflects my personal performance of the history, physical exam, assessment and plan. I have also personally directed, reviewed, and agreed with the chart.\par

## 2023-04-14 NOTE — ASSESSMENT
[FreeTextEntry1] : Patient is a 51 year year old F with a PMHx of obesity (BMI 46), HTN, hypercholesteremia and knee arthritis and PSHx of gastric bypass in 2007 and lap band reportedly in 2014 w/  who presents today for initial bariatric consult. Reports she did not lose much weight after lap band and has not had lap band adjustment in some time. Patient could not go through EGD after the last visit due to her issues with health insurance coverage. Recently, saw her PCP at which time her cholesterol was reported to be elevated. Discussed possible surgical and non-surgical treatment with the patient along with medical weight loss. At this time, will have the patient start on medical therapy and schedule for EGD Torr. Will have her return for follow up after the procedure.

## 2023-04-14 NOTE — HISTORY OF PRESENT ILLNESS
[de-identified] : Patient is a 51 year year old F with a PMHx of obesity (BMI 46), HTN, hypercholesteremia and knee arthritis and PSHx of gastric bypass in 2007 and lap band reportedly in 2014 w/  who presents today for initial bariatric consult. Reports she did not lose much weight after lap band and has not had lap band adjustment in some time. Patient could not go through EGD after the last visit due to her issues with health insurance coverage. Recently, saw her PCP at which time her cholesterol was reported to be elevated. Discussed possible surgical and non-surgical treatment with the patient along with medical weight loss. At this time, will have the patient start on medical therapy and schedule for EGD Tor and start mounjaro. Discussed instructions with pt in detail including injection site and once weekly use. Pt understands the side effect profile which may include n,v, diarrhea, injection site soreness, and pancreatitis in rare instances. Pt denies any first line family hx of medullary thyroid cancer or MEN2. Pt instructed to stop the medication and contact me if she develops and concerning adverse side effects. \par Will have her return for follow up after the procedure.

## 2023-04-18 RX ORDER — TIRZEPATIDE 2.5 MG/.5ML
2.5 INJECTION, SOLUTION SUBCUTANEOUS
Qty: 1 | Refills: 0 | Status: ACTIVE | COMMUNITY
Start: 2023-04-18 | End: 1900-01-01

## 2023-04-18 RX ORDER — TIRZEPATIDE 2.5 MG/.5ML
2.5 INJECTION, SOLUTION SUBCUTANEOUS
Qty: 4 | Refills: 0 | Status: DISCONTINUED | COMMUNITY
Start: 2023-04-13 | End: 2023-04-18

## 2023-08-18 NOTE — ED ADULT TRIAGE NOTE - CHIEF COMPLAINT QUOTE
Requested Prescriptions     Pending Prescriptions Disp Refills    traZODone (DESYREL) 100 MG tablet [Pharmacy Med Name: TRAZODONE 100MG TABLETS] 90 tablet 0     Sig: TAKE 1 TABLET BY MOUTH EVERY NIGHT          Last Office Visit: 2/27/2023     Next Office Visit: Visit date not found     Last Labs: 6/13/2022 patient c/o left leg pain since wednesday , went to urgent care flexeril was prescribed with no relief . Denies any injury .

## 2023-11-17 NOTE — ED ADULT NURSE NOTE - NS ED NURSE RECORD ANOTHER HT AND WT
Patient needs central access for TPN  Line removed 11/14  Last positive culture was 11/14 4 am,  ID recommends line replacement at 48 hour christi  IR typically recommends 72 hours especially with this patient's history  A PICC would be appropriate but VAT defers appropriately due to history - though successful with I  Arleen has RONEL at 1:40p at [place]  IR resources cannot accommodate after hours tunneled line placement  Would need to offer Monday appointment either inpatient or outpatient  Will attempt to do now prior to transport to [place] as resource has become available     Yes

## 2024-02-15 ENCOUNTER — APPOINTMENT (OUTPATIENT)
Dept: BARIATRICS | Facility: CLINIC | Age: 54
End: 2024-02-15
Payer: COMMERCIAL

## 2024-02-15 VITALS
BODY MASS INDEX: 50.24 KG/M2 | HEIGHT: 62 IN | SYSTOLIC BLOOD PRESSURE: 144 MMHG | TEMPERATURE: 97.4 F | OXYGEN SATURATION: 96 % | HEART RATE: 91 BPM | WEIGHT: 273 LBS | DIASTOLIC BLOOD PRESSURE: 84 MMHG

## 2024-02-15 DIAGNOSIS — E66.01 MORBID (SEVERE) OBESITY DUE TO EXCESS CALORIES: ICD-10-CM

## 2024-02-15 DIAGNOSIS — Z00.00 ENCOUNTER FOR GENERAL ADULT MEDICAL EXAMINATION W/OUT ABNORMAL FINDINGS: ICD-10-CM

## 2024-02-15 PROCEDURE — 99214 OFFICE O/P EST MOD 30 MIN: CPT

## 2024-02-15 NOTE — PLAN
[FreeTextEntry1] : - nutritionist today  - EGD tor and labs - f/u in 2 weeks to reassess for Mounjaro use

## 2024-02-15 NOTE — ADDENDUM
[FreeTextEntry1] :  Documented by Triny Vasquez acting as a scribe for KIM Strong  on 02/15/2024.

## 2024-02-15 NOTE — HISTORY OF PRESENT ILLNESS
[de-identified] : Patient is a 51 year year old F with a PMHx of obesity (BMI 46), HTN, hypercholesteremia and knee arthritis and PSHx of gastric bypass in 2007 and lap band reportedly in 2014 w/  who presents today for a follow up. Her BMI today is 49 and has gained 7 lbs since her last visit. She states that she did not get an EGD done or begin mounjaro (prescribed during her last visit) use due to insurance coverage. She denies emesis or any lap band adjustment. She continues experiences restriction from the lap band and takes PPI prn for acid reflux. We discussed getting bloodwork done and assessing it for the use of Mounjaro for medical weight loss She is compliant with her vitamins and has no other concerns at this time. We will get EGD tor and bloodwork done, meet the nutritionist today, and follow up 2 weeks to reassess for use of Mounjaro.

## 2024-02-15 NOTE — END OF VISIT
[Time Spent: ___ minutes] : I have spent [unfilled] minutes of time on the encounter. [FreeTextEntry3] :  All medical record entries made by the Scribe were at my, KIM Pan , direction and personally dictated by me on 02/15/2024 . I have reviewed the chart and agree that the record accurately reflects my personal performance of the history, physical exam, assessment and plan. I have also personally directed, reviewed, and agreed with the chart.

## 2024-02-15 NOTE — ASSESSMENT
[FreeTextEntry1] : Patient is a 51 year year old F with a PMHx of obesity (BMI 46), HTN, hypercholesteremia and knee arthritis and PSHx of gastric bypass in 2007 and lap band reportedly in 2014 w/  who presents today for a follow up. Her BMI today is 49 and has gained 7 lbs since her last visit. She states that she did not get an EGD done or begin mounjaro (prescribed during her last visit) use due to insurance coverage. She denies emesis or any lap band adjustment. She continues experiences restriction from the lap band and takes PPI prn for acid reflux. We discussed getting bloodwork done and assessing it for the use of Mounjaro for medical weight loss She is compliant with her vitamins and has no other concerns at this time. We will get EGD tor and bloodwork done, meet the nutritionist today, and follow up 2 weeks to reassess for use of Mounjaro.

## 2024-02-20 ENCOUNTER — NON-APPOINTMENT (OUTPATIENT)
Age: 54
End: 2024-02-20

## 2024-02-25 ENCOUNTER — TRANSCRIPTION ENCOUNTER (OUTPATIENT)
Age: 54
End: 2024-02-25

## 2024-02-26 ENCOUNTER — OUTPATIENT (OUTPATIENT)
Dept: OUTPATIENT SERVICES | Facility: HOSPITAL | Age: 54
LOS: 1 days | Discharge: HOME | End: 2024-02-26
Payer: COMMERCIAL

## 2024-02-26 VITALS
WEIGHT: 272.93 LBS | HEART RATE: 85 BPM | SYSTOLIC BLOOD PRESSURE: 156 MMHG | DIASTOLIC BLOOD PRESSURE: 95 MMHG | HEIGHT: 62 IN | RESPIRATION RATE: 20 BRPM | TEMPERATURE: 99 F | OXYGEN SATURATION: 98 %

## 2024-02-26 DIAGNOSIS — Z98.890 OTHER SPECIFIED POSTPROCEDURAL STATES: Chronic | ICD-10-CM

## 2024-02-26 DIAGNOSIS — Z41.9 ENCOUNTER FOR PROCEDURE FOR PURPOSES OTHER THAN REMEDYING HEALTH STATE, UNSPECIFIED: Chronic | ICD-10-CM

## 2024-02-26 PROCEDURE — 43999 UNLISTED PROCEDURE STOMACH: CPT

## 2024-02-26 RX ORDER — ACETAMINOPHEN 500 MG
20.3 TABLET ORAL
Qty: 162.4 | Refills: 0
Start: 2024-02-26 | End: 2024-02-27

## 2024-02-26 RX ORDER — ACETAMINOPHEN 500 MG
20.3 TABLET ORAL
Qty: 0.2 | Refills: 0
Start: 2024-02-26 | End: 2024-02-27

## 2024-02-26 RX ORDER — OMEPRAZOLE 40 MG/1
40 CAPSULE, DELAYED RELEASE ORAL TWICE DAILY
Qty: 56 | Refills: 0 | Status: ACTIVE | COMMUNITY
Start: 2024-02-26 | End: 1900-01-01

## 2024-02-26 RX ORDER — OXYCODONE HYDROCHLORIDE 5 MG/1
5 TABLET ORAL
Qty: 60 | Refills: 0
Start: 2024-02-26 | End: 2024-02-28

## 2024-02-26 NOTE — PRE-ANESTHESIA EVALUATION ADULT - NSANTHPMHFT_GEN_ALL_CORE
51 year year old F with a PMHx of obesity (BMI 46), HTN, hypercholesteremia and knee arthritis and PSHx of gastric bypass in 2007 and lap band reportedly in 2014 w/ Dr. Hardy who presents today for a follow up. Her BMI today is 49

## 2024-02-26 NOTE — PRE-ANESTHESIA EVALUATION ADULT - NSANTHHOMEMEDSFT_GEN_ALL_CORE
Albuterol Sulfate  (90 Base) MCG/ACT Inhalation Aerosol Solution  amLODIPine Besylate 5 MG Oral Tablet; TAKE 1 TABLET BY MOUTH ONCE DAILY  Breo Ellipta 200-25 MCG/INH AEPB  Cyanocobalamin 1000 MCG/ML Injection Solution  Cyclobenzaprine HCl - 5 MG Oral Tablet; TAKE 1 TABLET 3 TIMES DAILY AS NEEDED  Etodolac 400 MG Oral Tablet; take 1 tablet by mouth twice a day with meals  Etodolac 400 MG Oral Tablet; TAKE 1 TABLET TWICE DAILY WITH MEALS  Gel-One 30 MG/3ML Intra-articular Prefilled Syringe; one syringe per knee  Gel-One 30 MG/3ML Intra-articular Prefilled Syringe; one syringe per knee  hydroCHLOROthiazide 12.5 MG Oral Capsule  hydroCHLOROthiazide 25 MG Oral Tablet  HYDROcodone-Acetaminophen  MG Oral Tablet  HYDROcodone-Acetaminophen 5-325 MG Oral Tablet  Ibuprofen 600 MG Oral Tablet  Ketoconazole 2 % External Cream  Meloxicam 15 MG Oral Tablet  Metoprolol Tartrate 25 MG Oral Tablet  Mounjaro 2.5 MG/0.5ML Subcutaneous Solution Pen-injector; INJECT 0.5 ML Weekly  Naproxen 375 MG Oral Tablet; TAKE 1 TABLET EVERY 12 HOURS DAILY  Naproxen 500 MG Oral Tablet  Omeprazole 40 MG Oral Capsule Delayed Release; TAKE 1 CAPSULE Daily  Omeprazole TBEC  Ozempic (0.25 or 0.5 MG/DOSE) 2 MG/1.5ML SOPN; INJECT 0.5 MG SC Q WEEK  Pregabalin 150 MG Oral Capsule  Pregabalin 25 MG Oral Capsule; TAKE 1 CAPSULE TWICE DAILY. MDD:2 tabs  tiZANidine HCl - 4 MG Oral Tablet; TK 1 T PO QHS PRN  Topiramate 50 MG Oral Tablet Rosuvastatin

## 2024-03-05 RX ORDER — ERGOCALCIFEROL 1.25 MG/1
1.25 MG CAPSULE, LIQUID FILLED ORAL
Qty: 12 | Refills: 0 | Status: ACTIVE | COMMUNITY
Start: 2024-03-05 | End: 1900-01-01

## 2024-03-07 ENCOUNTER — APPOINTMENT (OUTPATIENT)
Dept: BARIATRICS | Facility: CLINIC | Age: 54
End: 2024-03-07
Payer: COMMERCIAL

## 2024-03-07 VITALS
HEART RATE: 97 BPM | OXYGEN SATURATION: 97 % | BODY MASS INDEX: 49.32 KG/M2 | HEIGHT: 62 IN | DIASTOLIC BLOOD PRESSURE: 83 MMHG | WEIGHT: 268 LBS | SYSTOLIC BLOOD PRESSURE: 138 MMHG | TEMPERATURE: 97.2 F

## 2024-03-07 PROCEDURE — 99214 OFFICE O/P EST MOD 30 MIN: CPT

## 2024-03-07 RX ORDER — TIRZEPATIDE 2.5 MG/.5ML
2.5 INJECTION, SOLUTION SUBCUTANEOUS
Qty: 1 | Refills: 0 | Status: ACTIVE | COMMUNITY
Start: 2024-03-07 | End: 1900-01-01

## 2024-04-15 ENCOUNTER — APPOINTMENT (OUTPATIENT)
Dept: BARIATRICS | Facility: CLINIC | Age: 54
End: 2024-04-15

## 2024-04-30 NOTE — ED ADULT NURSE NOTE - NSFALLRSKOUTCOME_ED_ALL_ED
"Anesthesia Pre-Procedure Evaluation    Patient: Vashti Suresh   MRN:     [id number] Gender:   female   Age:    7 year old :      2017        Procedure(s):  Myringotomy, Insert Tube(s), Adenoidectomy     LABS:  CBC:   Lab Results   Component Value Date    HGB 2016    HCT 2016     BMP:   Lab Results   Component Value Date    GLC 39 (LL) 2016     COAGS: No results found for: \"PTT\", \"INR\", \"FIBR\"  POC:   Lab Results   Component Value Date    BGM 86 2016     OTHER:   Lab Results   Component Value Date    BILITOTAL 11.9 (H) 2016        Preop Vitals    BP Readings from Last 3 Encounters:   24 104/66 (79%, Z = 0.81 /  78%, Z = 0.77)*   23 98/68 (63%, Z = 0.33 /  85%, Z = 1.04)*   23 108/64 (87%, Z = 1.13 /  73%, Z = 0.61)*     *BP percentiles are based on the 2017 AAP Clinical Practice Guideline for girls    Pulse Readings from Last 3 Encounters:   24 100   23 88   23 92      Resp Readings from Last 3 Encounters:   24 22   23 22   23 18    SpO2 Readings from Last 3 Encounters:   24 97%   23 98%   23 98%      Temp Readings from Last 1 Encounters:   24 98.7  F (37.1  C) (Temporal)    Ht Readings from Last 1 Encounters:   24 1.302 m (4' 3.25\") (58%, Z= 0.20)*     * Growth percentiles are based on CDC (Girls, 2-20 Years) data.      Wt Readings from Last 1 Encounters:   24 27.8 kg (61 lb 3.2 oz) (61%, Z= 0.28)*     * Growth percentiles are based on CDC (Girls, 2-20 Years) data.    Estimated body mass index is 16.38 kg/m  as calculated from the following:    Height as of 24: 1.302 m (4' 3.25\").    Weight as of 24: 27.8 kg (61 lb 3.2 oz).     LDA:        Past Medical History:   Diagnosis Date     Chronic NIKIA (middle ear effusion), bilateral 2019     Failed hearing screening 2019     Failed  hearing screen 2016     Hyperbilirubinemia,  2016      Past " Surgical History:   Procedure Laterality Date     MYRINGOTOMY, INSERT TUBE BILATERAL, COMBINED Bilateral 2019    Procedure: Bilateral myringotomy tubes;  Surgeon: Lewis Uriarte MD;  Location: [facility]      Allergies   Allergen Reactions     Amoxicillin Rash        Anesthesia Evaluation    ROS/Med Hx    No history of anesthetic complications  (-) malignant hyperthermia and tuberculosis    Cardiovascular Findings - negative ROS    Neuro Findings - negative ROS    Pulmonary Findings - negative ROS    HENT Findings   Comments: COM, Adenoid hypertrophy     Skin Findings - negative skin ROS     Findings   (-) prematurity and complications at birth      GI/Hepatic/Renal Findings - negative ROS    Endocrine/Metabolic Findings - negative ROS      Genetic/Syndrome Findings - negative genetics/syndromes ROS    Hematology/Oncology Findings - negative hematology/oncology ROS            PHYSICAL EXAM:   Mental Status/Neuro: Age Appropriate   Airway: Facies: Feasible  Mallampati: I  Mouth/Opening: Full  TM distance: Normal (Peds)  Neck ROM: Full   Respiratory: Auscultation: CTAB     Resp. Rate: Age appropriate     Resp. Effort: Normal      CV: Rhythm: Regular  Rate: Age appropriate  Heart: Normal Sounds  Edema: None   Comments:      Dental: Normal Dentition                Anesthesia Plan    ASA Status:  1   NPO Status:  NPO Appropriate    Anesthesia Type: General.     - Airway: ETT      Maintenance: Inhalation.        Consents    Anesthesia Plan(s) and associated risks, benefits, and realistic alternatives discussed. Questions answered and patient/representative(s) expressed understanding.    - Discussed:     - Discussed with:  Parent (Mother and/or Father)      - Extended Intubation/Ventilatory Support Discussed: No.      - Patient is DNR/DNI Status: No    Use of blood products discussed: No .     Postoperative Care    Pain management: IV analgesics, Oral pain medications.   PONV prophylaxis: Ondansetron (or other  5HT-3), Dexamethasone or Solumedrol     Comments:    Other Comments: The risks and benefits of anesthesia, and the alternatives where applicable, have been discussed with the patient/parent , and they wish to proceed.         JOSÉ MANUEL Up CRNA    I have reviewed the pertinent notes and labs in the chart from the past 30 days and (re)examined the patient.  Any updates or changes from those notes are reflected in this note.               Universal Safety Interventions

## 2024-05-08 RX ORDER — TIRZEPATIDE 5 MG/.5ML
5 INJECTION, SOLUTION SUBCUTANEOUS WEEKLY
Qty: 4 | Refills: 0 | Status: ACTIVE | COMMUNITY
Start: 2024-05-08 | End: 1900-01-01

## 2024-05-13 NOTE — HISTORY OF PRESENT ILLNESS
[de-identified] : Pt is a 52 y/o F s/p RYGB 2007, hx of lap band with  2014, morbid obesity BMI of 49, with no recent adjustments, now 2 weeks s/p EGD TOR, who presents today feeling well here for routine f/u. Pt has lost 5 lbs since the procedure which she is very happy with and states she feels restriction when she eats. Reports consuming mainly liquids including protein shakes since the procedure, will meet with nutrition today to advance diet. Reviewed the importance of healthy eating, portion control, and physical activity to aid her wt loss efforts. Recommended physical activity 3-5x/week. Denies any n,v,c,d,reflux, abd pn. Pt interested in GLP1 initiation to aid her wt loss efforts. Discussed GLP 1 instructions with pt in great detail including injection site and once weekly use. Pt understands the side effect profile which may include n,v, c,d, abd pn, injection site soreness, and pancreatitis in rare instances. Pt denies any first line family hx of medullary thyroid cancer or MEN2. Pt instructed to stop the medication and contact me for any concerning adverse side effects. Pt advised to f/u in 1 mo after starting the medication.No other concerns at this time.

## 2024-05-13 NOTE — ASSESSMENT
[FreeTextEntry1] : Pt is a 52 y/o F s/p RYGB 2007, hx of lap band with  2014 with no recent adjustments, now 2 weeks s/p EGD TOR, who presents today feeling well here for routine f/u. Pt has lost 5 lbs since the procedure which she is very happy with and states she feels restriction when she eats. Reports consuming mainly liquids including protein shakes since the procedure, will meet with nutrition today to advance diet. Reviewed the importance of healthy eating, portion control, and physical activity to aid her wt loss efforts. Recommended physical activity 3-5x/week. Denies any n,v,c,d,reflux, abd pn. Pt interested in GLP1 initiation to aid her wt loss efforts. Discussed GLP 1 instructions with pt in great detail including injection site and once weekly use. Pt understands the side effect profile which may include n,v, c,d, abd pn, injection site soreness, and pancreatitis in rare instances. Pt denies any first line family hx of medullary thyroid cancer or MEN2. Pt instructed to stop the medication and contact me for any concerning adverse side effects. Pt advised to f/u in 1 mo after starting the medication.No other concerns at this time.

## 2024-05-16 ENCOUNTER — APPOINTMENT (OUTPATIENT)
Dept: BARIATRICS | Facility: CLINIC | Age: 54
End: 2024-05-16
Payer: COMMERCIAL

## 2024-05-16 VITALS
WEIGHT: 271 LBS | TEMPERATURE: 97.4 F | OXYGEN SATURATION: 97 % | SYSTOLIC BLOOD PRESSURE: 143 MMHG | HEART RATE: 100 BPM | BODY MASS INDEX: 49.87 KG/M2 | HEIGHT: 62 IN | DIASTOLIC BLOOD PRESSURE: 93 MMHG

## 2024-05-16 DIAGNOSIS — E66.01 MORBID (SEVERE) OBESITY DUE TO EXCESS CALORIES: ICD-10-CM

## 2024-05-16 DIAGNOSIS — Z98.84 BARIATRIC SURGERY STATUS: ICD-10-CM

## 2024-05-16 PROCEDURE — 99214 OFFICE O/P EST MOD 30 MIN: CPT

## 2024-05-17 PROBLEM — E66.01 OBESITY, CLASS III, BMI 40-49.9 (MORBID OBESITY): Status: ACTIVE | Noted: 2024-03-07

## 2024-05-17 PROBLEM — Z98.84 HISTORY OF ROUX-EN-Y GASTRIC BYPASS: Status: ACTIVE | Noted: 2024-03-07

## 2024-05-17 NOTE — HISTORY OF PRESENT ILLNESS
[de-identified] : Pt is a 54 y/o F s/p RYGB 2007, hx of lap band with  2014, morbid obesity BMI of 49, with no recent adjustments, s/p EGD TOR in March, who presents today feeling well here for routine f/u. Pt has just completed her first month of zepbound 2.5mg. Denies any adverse side effects including any n,v,c,d, abd pn. States she has been trying to follow a healthy diet. Encouraged daily physical activity daily to aid her wt loss efforts. Pt has gained 3 lbs from last visit. Pt understands that the medication is a tool and diet and exercise are paramount to do well and lose wt. Pt to consult with nutrition today. Will increase dose to 5 mg. Pt to f/u with PA in 1 mo.

## 2024-05-17 NOTE — ASSESSMENT
[FreeTextEntry1] : Pt is a 52 y/o F s/p RYGB 2007, hx of lap band with  2014, morbid obesity BMI of 49, with no recent adjustments, s/p EGD TOR in March, who presents today feeling well here for routine f/u. Pt has just completed her first month of zepbound 2.5mg. Denies any adverse side effects including any n,v,c,d, abd pn. States she has been trying to follow a healthy diet. Encouraged daily physical activity daily to aid her wt loss efforts. Pt has gained 3 lbs from last visit. Pt understands that the medication is a tool and diet and exercise are paramount to do well and lose wt. Pt to consult with nutrition today. Will increase dose to 5 mg. Pt to f/u with PA in 1 mo.

## 2024-05-17 NOTE — PLAN
[FreeTextEntry1] : start zepbound 5 mg f/u with PA in 1 mo diet and exercise compliance to aid wt loss efforts Strong peripheral pulses

## 2024-07-18 ENCOUNTER — APPOINTMENT (OUTPATIENT)
Dept: BARIATRICS | Facility: CLINIC | Age: 54
End: 2024-07-18
Payer: COMMERCIAL

## 2024-07-18 VITALS
DIASTOLIC BLOOD PRESSURE: 97 MMHG | WEIGHT: 267.5 LBS | OXYGEN SATURATION: 96 % | TEMPERATURE: 97.5 F | HEART RATE: 91 BPM | HEIGHT: 62 IN | SYSTOLIC BLOOD PRESSURE: 136 MMHG | BODY MASS INDEX: 49.23 KG/M2

## 2024-07-18 DIAGNOSIS — Z98.84 BARIATRIC SURGERY STATUS: ICD-10-CM

## 2024-07-18 PROCEDURE — 99214 OFFICE O/P EST MOD 30 MIN: CPT

## 2024-07-18 RX ORDER — TIRZEPATIDE 5 MG/.5ML
5 INJECTION, SOLUTION SUBCUTANEOUS
Qty: 4 | Refills: 0 | Status: ACTIVE | COMMUNITY
Start: 2024-07-18 | End: 1900-01-01

## 2024-08-15 ENCOUNTER — APPOINTMENT (OUTPATIENT)
Dept: BARIATRICS | Facility: CLINIC | Age: 54
End: 2024-08-15
Payer: COMMERCIAL

## 2024-08-15 VITALS
WEIGHT: 270 LBS | BODY MASS INDEX: 49.69 KG/M2 | TEMPERATURE: 97.3 F | SYSTOLIC BLOOD PRESSURE: 128 MMHG | DIASTOLIC BLOOD PRESSURE: 85 MMHG | HEART RATE: 100 BPM | HEIGHT: 62 IN | OXYGEN SATURATION: 97 %

## 2024-08-15 DIAGNOSIS — Z98.84 BARIATRIC SURGERY STATUS: ICD-10-CM

## 2024-08-15 PROCEDURE — 99214 OFFICE O/P EST MOD 30 MIN: CPT

## 2024-08-15 RX ORDER — TIRZEPATIDE 7.5 MG/.5ML
7.5 INJECTION, SOLUTION SUBCUTANEOUS
Qty: 8 | Refills: 0 | Status: ACTIVE | COMMUNITY
Start: 2024-08-15 | End: 1900-01-01

## 2024-08-15 NOTE — HISTORY OF PRESENT ILLNESS
[de-identified] : Pt is a 52 y/o F s/p RYGB 2007, hx of lap band with  2014, morbid obesity BMI of 49, with no recent adjustments, s/p EGD TOR in March, who presents today feeling well here for f/u. Patient has been on 5 mg Zepbound for 3 months now. Patient has gained 3 lbs since her last visit and admits that she has not been exercising regularly. Patient has been following up with nutritionist weekly and maintains a healthy diet. Reviewed the importance of adhering to lifestyle modifications and increasing physical activity to optimize weight loss. No other concerns. At this time, will increase dose to 7.5 mg, have patient meet the nutritionist today, and follow up in 2 months.

## 2024-08-15 NOTE — ASSESSMENT
[FreeTextEntry1] : Pt is a 54 y/o F s/p RYGB 2007, hx of lap band with  2014, morbid obesity BMI of 49, with no recent adjustments, s/p EGD TOR in March, who presents today feeling well here for f/u. Patient has been on 5 mg Zepbound for 3 months now. Patient has gained 3 lbs since her last visit and admits that she has not been exercising regularly. Patient has been following up with nutritionist weekly and maintains a healthy diet. Reviewed the importance of adhering to lifestyle modifications and increasing physical activity to optimize weight loss. No other concerns. At this time, will increase dose to 7.5 mg, have patient meet the nutritionist today, and follow up in 2 months.

## 2024-08-15 NOTE — END OF VISIT
[Time Spent: ___ minutes] : I have spent [unfilled] minutes of time on the encounter. [FreeTextEntry3] :  All medical record entries made by the Scribe were at my, KIM Pan , direction and personally dictated by me on 08/15/2024 . I have reviewed the chart and agree that the record accurately reflects my personal performance of the history, physical exam, assessment and plan. I have also personally directed, reviewed, and agreed with the chart.

## 2024-08-15 NOTE — PLAN
[FreeTextEntry1] : - increase physical activity -diet compliance - Rx 7.5 mg Zepbound - nutrition today - f/u in 2 months

## 2024-08-15 NOTE — ADDENDUM
[FreeTextEntry1] :  Documented by Triny Vasquez acting as a scribe for KIM Strong  on 08/15/2024  .

## 2024-10-10 ENCOUNTER — APPOINTMENT (OUTPATIENT)
Dept: BARIATRICS | Facility: CLINIC | Age: 54
End: 2024-10-10
Payer: COMMERCIAL

## 2024-10-10 VITALS
HEART RATE: 108 BPM | SYSTOLIC BLOOD PRESSURE: 136 MMHG | WEIGHT: 263 LBS | DIASTOLIC BLOOD PRESSURE: 82 MMHG | OXYGEN SATURATION: 99 % | BODY MASS INDEX: 48.4 KG/M2 | HEIGHT: 62 IN | TEMPERATURE: 97 F

## 2024-10-10 DIAGNOSIS — Z98.84 BARIATRIC SURGERY STATUS: ICD-10-CM

## 2024-10-10 DIAGNOSIS — E66.01 MORBID (SEVERE) OBESITY DUE TO EXCESS CALORIES: ICD-10-CM

## 2024-10-10 PROCEDURE — 99214 OFFICE O/P EST MOD 30 MIN: CPT

## 2024-10-10 RX ORDER — TIRZEPATIDE 7.5 MG/.5ML
7.5 INJECTION, SOLUTION SUBCUTANEOUS
Qty: 4 | Refills: 1 | Status: ACTIVE | COMMUNITY
Start: 2024-10-10 | End: 1900-01-01

## 2024-10-11 RX ORDER — OMEPRAZOLE 40 MG/1
40 CAPSULE, DELAYED RELEASE ORAL
Qty: 30 | Refills: 1 | Status: DISCONTINUED | COMMUNITY
Start: 2024-10-10 | End: 2024-10-11

## 2024-10-11 RX ORDER — PANTOPRAZOLE 40 MG/1
40 TABLET, DELAYED RELEASE ORAL DAILY
Qty: 30 | Refills: 1 | Status: ACTIVE | COMMUNITY
Start: 2024-10-11 | End: 1900-01-01

## 2024-10-14 DIAGNOSIS — K21.9 GASTRO-ESOPHAGEAL REFLUX DISEASE W/OUT ESOPHAGITIS: ICD-10-CM

## 2024-11-04 NOTE — ED ADULT TRIAGE NOTE - SPO2 (%)
Received a My chart message from pt that she recently had brain surgery. She was contacted about formula being delivered. Updated Dipika Marina and she will speak with Sejal. Sejal called back and confirmed she needed a new script for [facility] and she was taking 2 ensure daily. Completed CMN and emailed to office to fax to [facility].    99

## 2024-11-23 NOTE — ED ADULT TRIAGE NOTE - TEMPERATURE IN CELSIUS (DEGREES C)
Refused Amlodipine prescription, messaged pt via [facility] to follow up in clinic for further refills last month. Last seen in clinic on 11/2023. No follow ups on file.     No future appointments.  Kera Simon MD    
133
36.9

## 2025-01-09 ENCOUNTER — APPOINTMENT (OUTPATIENT)
Dept: BARIATRICS | Facility: CLINIC | Age: 55
End: 2025-01-09
Payer: COMMERCIAL

## 2025-01-09 VITALS
OXYGEN SATURATION: 100 % | DIASTOLIC BLOOD PRESSURE: 83 MMHG | HEART RATE: 96 BPM | SYSTOLIC BLOOD PRESSURE: 136 MMHG | BODY MASS INDEX: 49.32 KG/M2 | TEMPERATURE: 97.1 F | WEIGHT: 268 LBS | HEIGHT: 62 IN

## 2025-01-09 DIAGNOSIS — Z00.00 ENCOUNTER FOR GENERAL ADULT MEDICAL EXAMINATION W/OUT ABNORMAL FINDINGS: ICD-10-CM

## 2025-01-09 DIAGNOSIS — Z98.84 BARIATRIC SURGERY STATUS: ICD-10-CM

## 2025-01-09 PROCEDURE — 99214 OFFICE O/P EST MOD 30 MIN: CPT

## 2025-01-09 RX ORDER — TIRZEPATIDE 10 MG/.5ML
10 INJECTION, SOLUTION SUBCUTANEOUS
Qty: 4 | Refills: 2 | Status: ACTIVE | COMMUNITY
Start: 2025-01-09 | End: 1900-01-01

## 2025-01-09 RX ORDER — OMEPRAZOLE 40 MG/1
40 CAPSULE, DELAYED RELEASE ORAL
Qty: 30 | Refills: 0 | Status: ACTIVE | COMMUNITY
Start: 2025-01-09 | End: 1900-01-01

## 2025-03-25 DIAGNOSIS — E55.9 VITAMIN D DEFICIENCY, UNSPECIFIED: ICD-10-CM

## 2025-03-25 RX ORDER — TIRZEPATIDE 10 MG/.5ML
10 INJECTION, SOLUTION SUBCUTANEOUS
Qty: 4 | Refills: 1 | Status: ACTIVE | COMMUNITY
Start: 2025-03-25 | End: 1900-01-01

## 2025-03-25 RX ORDER — ERGOCALCIFEROL 1.25 MG/1
1.25 MG CAPSULE, LIQUID FILLED ORAL
Qty: 12 | Refills: 0 | Status: ACTIVE | COMMUNITY
Start: 2025-03-25 | End: 1900-01-01

## 2025-04-09 ENCOUNTER — NON-APPOINTMENT (OUTPATIENT)
Age: 55
End: 2025-04-09

## 2025-04-18 ENCOUNTER — OUTPATIENT (OUTPATIENT)
Dept: OUTPATIENT SERVICES | Facility: HOSPITAL | Age: 55
LOS: 1 days | Discharge: HOME | End: 2025-04-18
Payer: COMMERCIAL

## 2025-04-18 VITALS
RESPIRATION RATE: 18 BRPM | OXYGEN SATURATION: 96 % | TEMPERATURE: 99 F | WEIGHT: 259.93 LBS | HEIGHT: 61 IN | SYSTOLIC BLOOD PRESSURE: 194 MMHG | DIASTOLIC BLOOD PRESSURE: 94 MMHG | HEART RATE: 90 BPM

## 2025-04-18 DIAGNOSIS — Z41.9 ENCOUNTER FOR PROCEDURE FOR PURPOSES OTHER THAN REMEDYING HEALTH STATE, UNSPECIFIED: Chronic | ICD-10-CM

## 2025-04-18 DIAGNOSIS — Z98.890 OTHER SPECIFIED POSTPROCEDURAL STATES: Chronic | ICD-10-CM

## 2025-04-18 PROCEDURE — 43999 UNLISTED PROCEDURE STOMACH: CPT

## 2025-04-18 PROCEDURE — C1889: CPT

## 2025-04-18 DEVICE — SUT CINCH LONG: Type: IMPLANTABLE DEVICE | Status: FUNCTIONAL

## 2025-04-18 DEVICE — PROBE FIAPC DIA 2.3MM/7FR LNTH 220CM/7.2FT: Type: IMPLANTABLE DEVICE | Status: FUNCTIONAL

## 2025-04-18 RX ORDER — HYDROMORPHONE/SOD CHLOR,ISO/PF 2 MG/10 ML
0.5 SYRINGE (ML) INJECTION ONCE
Refills: 0 | Status: COMPLETED | OUTPATIENT
Start: 2025-04-18 | End: 2025-04-18

## 2025-04-18 RX ORDER — CEFOTETAN DISODIUM 1 G
2 VIAL (EA) INJECTION ONCE
Refills: 0 | Status: DISCONTINUED | OUTPATIENT
Start: 2025-04-18 | End: 2025-04-18

## 2025-04-18 NOTE — PRE-ANESTHESIA EVALUATION ADULT - NSANTHPEFT_GEN_ALL_CORE
well developed well nourished obese female in NAd  awake, alert, and oriented  +S1/S2  B/L air entry

## 2025-04-21 RX ORDER — PANTOPRAZOLE 40 MG/1
40 TABLET, DELAYED RELEASE ORAL
Qty: 30 | Refills: 0 | Status: ACTIVE | COMMUNITY
Start: 2025-04-21 | End: 1900-01-01

## 2025-05-01 ENCOUNTER — APPOINTMENT (OUTPATIENT)
Dept: BARIATRICS | Facility: CLINIC | Age: 55
End: 2025-05-01
Payer: COMMERCIAL

## 2025-05-01 ENCOUNTER — NON-APPOINTMENT (OUTPATIENT)
Age: 55
End: 2025-05-01

## 2025-05-01 VITALS
DIASTOLIC BLOOD PRESSURE: 85 MMHG | OXYGEN SATURATION: 97 % | SYSTOLIC BLOOD PRESSURE: 126 MMHG | TEMPERATURE: 98.1 F | WEIGHT: 270 LBS | HEIGHT: 62 IN | HEART RATE: 96 BPM | BODY MASS INDEX: 49.69 KG/M2

## 2025-05-01 DIAGNOSIS — Z00.00 ENCOUNTER FOR GENERAL ADULT MEDICAL EXAMINATION W/OUT ABNORMAL FINDINGS: ICD-10-CM

## 2025-05-01 DIAGNOSIS — Z98.84 BARIATRIC SURGERY STATUS: ICD-10-CM

## 2025-05-01 DIAGNOSIS — E66.01 MORBID (SEVERE) OBESITY DUE TO EXCESS CALORIES: ICD-10-CM

## 2025-05-01 DIAGNOSIS — Z86.39 PERSONAL HISTORY OF OTHER ENDOCRINE, NUTRITIONAL AND METABOLIC DISEASE: ICD-10-CM

## 2025-05-01 PROCEDURE — 99214 OFFICE O/P EST MOD 30 MIN: CPT

## 2025-05-02 PROBLEM — Z86.39 HISTORY OF MORBID OBESITY: Status: RESOLVED | Noted: 2023-04-13 | Resolved: 2025-05-02

## 2025-05-02 PROBLEM — E66.01 MORBID OBESITY: Status: ACTIVE | Noted: 2025-05-02

## 2025-05-06 ENCOUNTER — APPOINTMENT (OUTPATIENT)
Dept: BARIATRICS | Facility: CLINIC | Age: 55
End: 2025-05-06

## 2025-05-08 ENCOUNTER — NON-APPOINTMENT (OUTPATIENT)
Age: 55
End: 2025-05-08

## 2025-05-08 ENCOUNTER — APPOINTMENT (OUTPATIENT)
Dept: HEART AND VASCULAR | Facility: CLINIC | Age: 55
End: 2025-05-08
Payer: COMMERCIAL

## 2025-05-08 VITALS
HEART RATE: 118 BPM | WEIGHT: 267.99 LBS | SYSTOLIC BLOOD PRESSURE: 140 MMHG | OXYGEN SATURATION: 96 % | DIASTOLIC BLOOD PRESSURE: 76 MMHG | HEIGHT: 62 IN | BODY MASS INDEX: 49.32 KG/M2 | TEMPERATURE: 98.8 F

## 2025-05-08 DIAGNOSIS — M25.471 EFFUSION, RIGHT ANKLE: ICD-10-CM

## 2025-05-08 DIAGNOSIS — Z01.810 ENCOUNTER FOR PREPROCEDURAL CARDIOVASCULAR EXAMINATION: ICD-10-CM

## 2025-05-08 DIAGNOSIS — I10 ESSENTIAL (PRIMARY) HYPERTENSION: ICD-10-CM

## 2025-05-08 PROCEDURE — 99203 OFFICE O/P NEW LOW 30 MIN: CPT | Mod: 25

## 2025-05-08 PROCEDURE — 93000 ELECTROCARDIOGRAM COMPLETE: CPT

## 2025-05-09 ENCOUNTER — APPOINTMENT (OUTPATIENT)
Dept: BARIATRICS | Facility: CLINIC | Age: 55
End: 2025-05-09
Payer: COMMERCIAL

## 2025-05-09 PROCEDURE — 97803 MED NUTRITION INDIV SUBSEQ: CPT | Mod: 95

## 2025-05-12 ENCOUNTER — NON-APPOINTMENT (OUTPATIENT)
Age: 55
End: 2025-05-12

## 2025-05-13 ENCOUNTER — APPOINTMENT (OUTPATIENT)
Dept: BARIATRICS | Facility: CLINIC | Age: 55
End: 2025-05-13

## 2025-05-19 ENCOUNTER — APPOINTMENT (OUTPATIENT)
Dept: HEART AND VASCULAR | Facility: CLINIC | Age: 55
End: 2025-05-19

## 2025-05-27 NOTE — ASU PATIENT PROFILE, ADULT - INTERNATIONAL TRAVEL
No Patient advised of below he verbalized understanding. Service to [facility] placed for [employer]. No further questions at this time.

## 2025-05-27 NOTE — ASU PATIENT PROFILE, ADULT - NSICDXPASTMEDICALHX_GEN_ALL_CORE_FT
PAST MEDICAL HISTORY:  Asthma     Bipolar disorder     GERD (gastroesophageal reflux disease)     Hyperlipidemia     Intramural leiomyoma of uterus     Leg numbness     Leg pain     Obesity

## 2025-05-28 ENCOUNTER — RESULT REVIEW (OUTPATIENT)
Age: 55
End: 2025-05-28

## 2025-05-28 ENCOUNTER — TRANSCRIPTION ENCOUNTER (OUTPATIENT)
Age: 55
End: 2025-05-28

## 2025-05-28 ENCOUNTER — APPOINTMENT (OUTPATIENT)
Dept: BARIATRICS | Facility: HOSPITAL | Age: 55
End: 2025-05-28

## 2025-05-28 ENCOUNTER — INPATIENT (INPATIENT)
Facility: HOSPITAL | Age: 55
LOS: 0 days | Discharge: HOME | End: 2025-05-29
Attending: GENERAL ACUTE CARE HOSPITAL | Admitting: GENERAL ACUTE CARE HOSPITAL
Payer: COMMERCIAL

## 2025-05-28 VITALS
HEIGHT: 63 IN | SYSTOLIC BLOOD PRESSURE: 127 MMHG | TEMPERATURE: 98 F | DIASTOLIC BLOOD PRESSURE: 87 MMHG | RESPIRATION RATE: 16 BRPM | OXYGEN SATURATION: 98 % | HEART RATE: 79 BPM | WEIGHT: 270.51 LBS

## 2025-05-28 DIAGNOSIS — Z41.9 ENCOUNTER FOR PROCEDURE FOR PURPOSES OTHER THAN REMEDYING HEALTH STATE, UNSPECIFIED: Chronic | ICD-10-CM

## 2025-05-28 DIAGNOSIS — Z98.890 OTHER SPECIFIED POSTPROCEDURAL STATES: Chronic | ICD-10-CM

## 2025-05-28 LAB
BLD GP AB SCN SERPL QL: NEGATIVE — SIGNIFICANT CHANGE UP
HCT VFR BLD CALC: 38.6 % — SIGNIFICANT CHANGE UP (ref 34.5–45)
HGB BLD-MCNC: 12.5 G/DL — SIGNIFICANT CHANGE UP (ref 11.5–15.5)
MCHC RBC-ENTMCNC: 29.5 PG — SIGNIFICANT CHANGE UP (ref 27–34)
MCHC RBC-ENTMCNC: 32.4 G/DL — SIGNIFICANT CHANGE UP (ref 32–36)
MCV RBC AUTO: 91 FL — SIGNIFICANT CHANGE UP (ref 80–100)
NRBC BLD AUTO-RTO: 0 /100 WBCS — SIGNIFICANT CHANGE UP (ref 0–0)
PLATELET # BLD AUTO: 302 K/UL — SIGNIFICANT CHANGE UP (ref 150–400)
RBC # BLD: 4.24 M/UL — SIGNIFICANT CHANGE UP (ref 3.8–5.2)
RBC # FLD: 12.1 % — SIGNIFICANT CHANGE UP (ref 10.3–14.5)
RH IG SCN BLD-IMP: POSITIVE — SIGNIFICANT CHANGE UP
WBC # BLD: 9.43 K/UL — SIGNIFICANT CHANGE UP (ref 3.8–10.5)
WBC # FLD AUTO: 9.43 K/UL — SIGNIFICANT CHANGE UP (ref 3.8–10.5)

## 2025-05-28 PROCEDURE — 88300 SURGICAL PATH GROSS: CPT | Mod: 26,59

## 2025-05-28 PROCEDURE — 88307 TISSUE EXAM BY PATHOLOGIST: CPT | Mod: 26

## 2025-05-28 PROCEDURE — 43848 REVJ OPEN GSTR RSTCV PX: CPT | Mod: 22,59

## 2025-05-28 PROCEDURE — 43773 LAP REPLACE GASTR ADJ DEVICE: CPT | Mod: AS,22

## 2025-05-28 PROCEDURE — 43848 REVJ OPEN GSTR RSTCV PX: CPT | Mod: AS,22,59

## 2025-05-28 PROCEDURE — 43774 LAP RMVL GASTR ADJ ALL PARTS: CPT | Mod: 22

## 2025-05-28 DEVICE — XI STAPLER SUREFORM RELOAD 60 WHITE: Type: IMPLANTABLE DEVICE | Status: FUNCTIONAL

## 2025-05-28 DEVICE — XI STAPLER SUREFORM RELOAD 60 BLUE: Type: IMPLANTABLE DEVICE | Status: FUNCTIONAL

## 2025-05-28 RX ORDER — LIDOCAINE HYDROCHLORIDE 20 MG/ML
1 JELLY TOPICAL EVERY 24 HOURS
Refills: 0 | Status: DISCONTINUED | OUTPATIENT
Start: 2025-05-28 | End: 2025-05-29

## 2025-05-28 RX ORDER — ROSUVASTATIN CALCIUM 20 MG/1
1 TABLET, FILM COATED ORAL
Refills: 0 | DISCHARGE

## 2025-05-28 RX ORDER — BUPIVACAINE 13.3 MG/ML
20 INJECTION, SUSPENSION, LIPOSOMAL INFILTRATION ONCE
Refills: 0 | Status: DISCONTINUED | OUTPATIENT
Start: 2025-05-28 | End: 2025-05-29

## 2025-05-28 RX ORDER — ACETAMINOPHEN 500 MG/5ML
1000 LIQUID (ML) ORAL ONCE
Refills: 0 | Status: COMPLETED | OUTPATIENT
Start: 2025-05-28 | End: 2025-05-28

## 2025-05-28 RX ORDER — SODIUM CHLORIDE 9 G/1000ML
1000 INJECTION, SOLUTION INTRAVENOUS
Refills: 0 | Status: DISCONTINUED | OUTPATIENT
Start: 2025-05-28 | End: 2025-05-29

## 2025-05-28 RX ORDER — HYDROMORPHONE/SOD CHLOR,ISO/PF 2 MG/10 ML
0.5 SYRINGE (ML) INJECTION
Refills: 0 | Status: DISCONTINUED | OUTPATIENT
Start: 2025-05-28 | End: 2025-05-29

## 2025-05-28 RX ORDER — ENOXAPARIN SODIUM 100 MG/ML
40 INJECTION SUBCUTANEOUS ONCE
Refills: 0 | Status: COMPLETED | OUTPATIENT
Start: 2025-05-28 | End: 2025-05-28

## 2025-05-28 RX ORDER — ACETAMINOPHEN 500 MG/5ML
1000 LIQUID (ML) ORAL EVERY 6 HOURS
Refills: 0 | Status: DISCONTINUED | OUTPATIENT
Start: 2025-05-28 | End: 2025-05-29

## 2025-05-28 RX ORDER — FLUTICASONE FUROATE AND VILANTEROL TRIFENATATE 100; 25 UG/1; UG/1
1 POWDER RESPIRATORY (INHALATION)
Refills: 0 | DISCHARGE

## 2025-05-28 RX ORDER — SCOPOLAMINE 1 MG/3D
1 PATCH, EXTENDED RELEASE TRANSDERMAL ONCE
Refills: 0 | Status: COMPLETED | OUTPATIENT
Start: 2025-05-28 | End: 2025-05-28

## 2025-05-28 RX ORDER — ROSUVASTATIN CALCIUM 20 MG/1
5 TABLET, FILM COATED ORAL AT BEDTIME
Refills: 0 | Status: DISCONTINUED | OUTPATIENT
Start: 2025-05-28 | End: 2025-05-29

## 2025-05-28 RX ORDER — OXYCODONE HYDROCHLORIDE 30 MG/1
5 TABLET ORAL EVERY 6 HOURS
Refills: 0 | Status: DISCONTINUED | OUTPATIENT
Start: 2025-05-28 | End: 2025-05-29

## 2025-05-28 RX ORDER — TRANEXAMIC ACID 1000 MG/10
1000 AMPUL (ML) INTRAVENOUS ONCE
Refills: 0 | Status: COMPLETED | OUTPATIENT
Start: 2025-05-28 | End: 2025-05-28

## 2025-05-28 RX ORDER — MELATONIN 5 MG
5 TABLET ORAL AT BEDTIME
Refills: 0 | Status: DISCONTINUED | OUTPATIENT
Start: 2025-05-28 | End: 2025-05-29

## 2025-05-28 RX ORDER — AMLODIPINE BESYLATE 10 MG/1
5 TABLET ORAL DAILY
Refills: 0 | Status: DISCONTINUED | OUTPATIENT
Start: 2025-05-28 | End: 2025-05-29

## 2025-05-28 RX ORDER — OXYCODONE HYDROCHLORIDE 30 MG/1
2.5 TABLET ORAL EVERY 6 HOURS
Refills: 0 | Status: DISCONTINUED | OUTPATIENT
Start: 2025-05-28 | End: 2025-05-29

## 2025-05-28 RX ORDER — KETOROLAC TROMETHAMINE 30 MG/ML
15 INJECTION, SOLUTION INTRAMUSCULAR; INTRAVENOUS EVERY 6 HOURS
Refills: 0 | Status: DISCONTINUED | OUTPATIENT
Start: 2025-05-28 | End: 2025-05-29

## 2025-05-28 RX ORDER — APREPITANT 40 MG/1
80 CAPSULE ORAL ONCE
Refills: 0 | Status: COMPLETED | OUTPATIENT
Start: 2025-05-28 | End: 2025-05-28

## 2025-05-28 RX ORDER — ONDANSETRON HCL/PF 4 MG/2 ML
4 VIAL (ML) INJECTION EVERY 6 HOURS
Refills: 0 | Status: DISCONTINUED | OUTPATIENT
Start: 2025-05-28 | End: 2025-05-29

## 2025-05-28 RX ORDER — AMLODIPINE BESYLATE 10 MG/1
1 TABLET ORAL
Refills: 0 | DISCHARGE

## 2025-05-28 RX ADMIN — SODIUM CHLORIDE 160 MILLILITER(S): 9 INJECTION, SOLUTION INTRAVENOUS at 13:46

## 2025-05-28 RX ADMIN — Medication 40 MILLIGRAM(S): at 12:21

## 2025-05-28 RX ADMIN — Medication 400 MILLIGRAM(S): at 12:21

## 2025-05-28 RX ADMIN — OXYCODONE HYDROCHLORIDE 5 MILLIGRAM(S): 30 TABLET ORAL at 23:15

## 2025-05-28 RX ADMIN — Medication 1000 MILLIGRAM(S): at 18:30

## 2025-05-28 RX ADMIN — SODIUM CHLORIDE 160 MILLILITER(S): 9 INJECTION, SOLUTION INTRAVENOUS at 21:41

## 2025-05-28 RX ADMIN — OXYCODONE HYDROCHLORIDE 5 MILLIGRAM(S): 30 TABLET ORAL at 13:45

## 2025-05-28 RX ADMIN — SCOPOLAMINE 1 PATCH: 1 PATCH, EXTENDED RELEASE TRANSDERMAL at 19:17

## 2025-05-28 RX ADMIN — OXYCODONE HYDROCHLORIDE 5 MILLIGRAM(S): 30 TABLET ORAL at 14:40

## 2025-05-28 RX ADMIN — Medication 220 MILLIGRAM(S): at 19:44

## 2025-05-28 RX ADMIN — Medication 0.5 MILLIGRAM(S): at 11:59

## 2025-05-28 RX ADMIN — APREPITANT 80 MILLIGRAM(S): 40 CAPSULE ORAL at 07:31

## 2025-05-28 RX ADMIN — KETOROLAC TROMETHAMINE 15 MILLIGRAM(S): 30 INJECTION, SOLUTION INTRAMUSCULAR; INTRAVENOUS at 18:30

## 2025-05-28 RX ADMIN — Medication 105 MILLIGRAM(S): at 13:42

## 2025-05-28 RX ADMIN — SODIUM CHLORIDE 160 MILLILITER(S): 9 INJECTION, SOLUTION INTRAVENOUS at 12:21

## 2025-05-28 RX ADMIN — Medication 1000 MILLIGRAM(S): at 07:21

## 2025-05-28 RX ADMIN — ROSUVASTATIN CALCIUM 5 MILLIGRAM(S): 20 TABLET, FILM COATED ORAL at 21:46

## 2025-05-28 RX ADMIN — ENOXAPARIN SODIUM 40 MILLIGRAM(S): 100 INJECTION SUBCUTANEOUS at 07:22

## 2025-05-28 RX ADMIN — AMLODIPINE BESYLATE 5 MILLIGRAM(S): 10 TABLET ORAL at 21:46

## 2025-05-28 RX ADMIN — SCOPOLAMINE 1 PATCH: 1 PATCH, EXTENDED RELEASE TRANSDERMAL at 07:22

## 2025-05-28 RX ADMIN — Medication 0.5 MILLIGRAM(S): at 12:14

## 2025-05-28 RX ADMIN — OXYCODONE HYDROCHLORIDE 5 MILLIGRAM(S): 30 TABLET ORAL at 21:45

## 2025-05-28 NOTE — H&P ADULT - HISTORY OF PRESENT ILLNESS
54F pmhx of bipolar disorder, asthma, HLD, GERD, obesity s/p RYGB 2007, hx of lap band with  2014, s/p EGD TOR in March 2024 and April 2025 currently on mounjaro presenting for gastric bypass revision for continued weight gain despite GJ revision and GLP1 analog use to aid her wt loss efforts.     home meds: Amlodipine 5mg daily, gabapentin 600mg daily, Mobic 15mg PRN, esomeprazole 40mg daily as needed, rosuvastatin 5mg daily fluticasone PRN, Monjaro (Zepbound) q weekly

## 2025-05-28 NOTE — H&P ADULT - ASSESSMENT
54F pmhx of bipolar disorder, asthma, HLD, GERD, obesity s/p RYGB 2007, hx of lap band with  2014, s/p EGD TOR in March 2024 and April 2025 currently on mounjaro presenting for gastric bypass revision for continued weight gain despite GJ revision and GLP1 analog use to aid her wt loss efforts.     Proceed to OR   Admit to Dr. Tanner postop

## 2025-05-28 NOTE — BRIEF OPERATIVE NOTE - NSICDXBRIEFPROCEDURE_GEN_ALL_CORE_FT
PROCEDURES:  Robot-assisted revision of gastric bypass 28-May-2025 11:43:43  Sri Resendiz  Robot-assisted laparoscopic removal of gastric band 28-May-2025 11:44:54  Sri Resendiz

## 2025-05-28 NOTE — PROGRESS NOTE ADULT - SUBJECTIVE AND OBJECTIVE BOX
POST-OPERATIVE NOTE    Procedure: RA lap revision of gastric bypass and lap band removal      Diagnosis/Indication: Obesity     Surgeon: Dr. Tanner    S: Pt has no complaints. Denies CP, SOB, VACA, calf tenderness. Pain controlled with medication.    O:  T(C): 37.6 (05-28-25 @ 11:31), Max: 37.6 (05-28-25 @ 11:31)  T(F): 99.6 (05-28-25 @ 11:31), Max: 99.6 (05-28-25 @ 11:31)  HR: 83 (05-28-25 @ 13:01) (77 - 96)  BP: 110/58 (05-28-25 @ 12:31) (98/56 - 124/93)  RR: 12 (05-28-25 @ 13:01) (12 - 14)  SpO2: 97% (05-28-25 @ 13:01) (93% - 97%)  Wt(kg): --            Gen: NAD, resting comfortably in bed  C/V: NSR  Pulm: Nonlabored breathing, no respiratory distress  Abd: soft, non distended, TTP around incision site , incision clean dry   Extrem: WWP, no calf edema, SCDs in place

## 2025-05-28 NOTE — BRIEF OPERATIVE NOTE - OPERATION/FINDINGS
Incision made over lap band port. Lap band port dissected and removed. Veress needle placed, abdomen insufflated, robot ports placed, lap bad dissected and removed via 12 robot port, excess scar tissue surrounding stomach removed. BP limb extended another 125cm, new jejunojejunostomy created with sureform white load stapler and 2-0 quil. Mesenteric defect closed with 2-0 vloc. Abdomen inspected and hemostasis achieved. Fascia closed with 0 vicryl on endoclose device. Subq closed with vicryl, skin closed with Monocryl

## 2025-05-29 ENCOUNTER — TRANSCRIPTION ENCOUNTER (OUTPATIENT)
Age: 55
End: 2025-05-29

## 2025-05-29 VITALS
OXYGEN SATURATION: 97 % | SYSTOLIC BLOOD PRESSURE: 120 MMHG | RESPIRATION RATE: 18 BRPM | HEART RATE: 64 BPM | TEMPERATURE: 98 F | DIASTOLIC BLOOD PRESSURE: 64 MMHG

## 2025-05-29 LAB
ANION GAP SERPL CALC-SCNC: 7 MMOL/L — SIGNIFICANT CHANGE UP (ref 5–17)
BUN SERPL-MCNC: 9 MG/DL — SIGNIFICANT CHANGE UP (ref 7–23)
CALCIUM SERPL-MCNC: 8.6 MG/DL — SIGNIFICANT CHANGE UP (ref 8.4–10.5)
CHLORIDE SERPL-SCNC: 108 MMOL/L — SIGNIFICANT CHANGE UP (ref 96–108)
CO2 SERPL-SCNC: 24 MMOL/L — SIGNIFICANT CHANGE UP (ref 22–31)
CREAT SERPL-MCNC: 0.62 MG/DL — SIGNIFICANT CHANGE UP (ref 0.5–1.3)
EGFR: 105 ML/MIN/1.73M2 — SIGNIFICANT CHANGE UP
EGFR: 105 ML/MIN/1.73M2 — SIGNIFICANT CHANGE UP
GLUCOSE SERPL-MCNC: 136 MG/DL — HIGH (ref 70–99)
HCT VFR BLD CALC: 33.6 % — LOW (ref 34.5–45)
HGB BLD-MCNC: 11.4 G/DL — LOW (ref 11.5–15.5)
MAGNESIUM SERPL-MCNC: 2 MG/DL — SIGNIFICANT CHANGE UP (ref 1.6–2.6)
MCHC RBC-ENTMCNC: 30.3 PG — SIGNIFICANT CHANGE UP (ref 27–34)
MCHC RBC-ENTMCNC: 33.9 G/DL — SIGNIFICANT CHANGE UP (ref 32–36)
MCV RBC AUTO: 89.4 FL — SIGNIFICANT CHANGE UP (ref 80–100)
NRBC BLD AUTO-RTO: 0 /100 WBCS — SIGNIFICANT CHANGE UP (ref 0–0)
PHOSPHATE SERPL-MCNC: 3.3 MG/DL — SIGNIFICANT CHANGE UP (ref 2.5–4.5)
PLATELET # BLD AUTO: 271 K/UL — SIGNIFICANT CHANGE UP (ref 150–400)
POTASSIUM SERPL-MCNC: 3.7 MMOL/L — SIGNIFICANT CHANGE UP (ref 3.5–5.3)
POTASSIUM SERPL-SCNC: 3.7 MMOL/L — SIGNIFICANT CHANGE UP (ref 3.5–5.3)
RBC # BLD: 3.76 M/UL — LOW (ref 3.8–5.2)
RBC # FLD: 12 % — SIGNIFICANT CHANGE UP (ref 10.3–14.5)
SODIUM SERPL-SCNC: 139 MMOL/L — SIGNIFICANT CHANGE UP (ref 135–145)
WBC # BLD: 7.12 K/UL — SIGNIFICANT CHANGE UP (ref 3.8–10.5)
WBC # FLD AUTO: 7.12 K/UL — SIGNIFICANT CHANGE UP (ref 3.8–10.5)

## 2025-05-29 PROCEDURE — 85027 COMPLETE CBC AUTOMATED: CPT

## 2025-05-29 PROCEDURE — 83735 ASSAY OF MAGNESIUM: CPT

## 2025-05-29 PROCEDURE — S2900: CPT

## 2025-05-29 PROCEDURE — 36415 COLL VENOUS BLD VENIPUNCTURE: CPT

## 2025-05-29 PROCEDURE — 86901 BLOOD TYPING SEROLOGIC RH(D): CPT

## 2025-05-29 PROCEDURE — 86900 BLOOD TYPING SEROLOGIC ABO: CPT

## 2025-05-29 PROCEDURE — 80048 BASIC METABOLIC PNL TOTAL CA: CPT

## 2025-05-29 PROCEDURE — 84100 ASSAY OF PHOSPHORUS: CPT

## 2025-05-29 PROCEDURE — C9399: CPT

## 2025-05-29 PROCEDURE — 88307 TISSUE EXAM BY PATHOLOGIST: CPT

## 2025-05-29 PROCEDURE — C1889: CPT

## 2025-05-29 PROCEDURE — 88300 SURGICAL PATH GROSS: CPT

## 2025-05-29 PROCEDURE — 86850 RBC ANTIBODY SCREEN: CPT

## 2025-05-29 RX ORDER — ACETAMINOPHEN 500 MG/5ML
20 LIQUID (ML) ORAL
Qty: 320 | Refills: 0
Start: 2025-05-29 | End: 2025-06-01

## 2025-05-29 RX ORDER — OMEPRAZOLE 20 MG/1
1 CAPSULE, DELAYED RELEASE ORAL
Qty: 30 | Refills: 0
Start: 2025-05-29 | End: 2025-06-27

## 2025-05-29 RX ORDER — GABAPENTIN 400 MG/1
1 CAPSULE ORAL
Refills: 0 | DISCHARGE

## 2025-05-29 RX ORDER — ONDANSETRON HCL/PF 4 MG/2 ML
1 VIAL (ML) INJECTION
Qty: 12 | Refills: 0
Start: 2025-05-29 | End: 2025-05-31

## 2025-05-29 RX ORDER — ENOXAPARIN SODIUM 100 MG/ML
40 INJECTION SUBCUTANEOUS EVERY 24 HOURS
Refills: 0 | Status: DISCONTINUED | OUTPATIENT
Start: 2025-05-29 | End: 2025-05-29

## 2025-05-29 RX ORDER — ESOMEPRAZOLE MAGNESIUM 40 MG/1
1 CAPSULE, DELAYED RELEASE ORAL
Refills: 0 | DISCHARGE

## 2025-05-29 RX ORDER — MELOXICAM 15 MG/1
1 TABLET ORAL
Refills: 0 | DISCHARGE

## 2025-05-29 RX ORDER — APIXABAN 2.5 MG/1
1 TABLET, FILM COATED ORAL
Qty: 60 | Refills: 0
Start: 2025-05-29 | End: 2025-06-27

## 2025-05-29 RX ADMIN — Medication 1000 MILLIGRAM(S): at 06:26

## 2025-05-29 RX ADMIN — Medication 1000 MILLIGRAM(S): at 07:00

## 2025-05-29 RX ADMIN — SCOPOLAMINE 1 PATCH: 1 PATCH, EXTENDED RELEASE TRANSDERMAL at 06:26

## 2025-05-29 RX ADMIN — KETOROLAC TROMETHAMINE 15 MILLIGRAM(S): 30 INJECTION, SOLUTION INTRAMUSCULAR; INTRAVENOUS at 00:51

## 2025-05-29 RX ADMIN — KETOROLAC TROMETHAMINE 15 MILLIGRAM(S): 30 INJECTION, SOLUTION INTRAMUSCULAR; INTRAVENOUS at 00:30

## 2025-05-29 RX ADMIN — Medication 1000 MILLIGRAM(S): at 00:30

## 2025-05-29 RX ADMIN — Medication 20 MILLIEQUIVALENT(S): at 11:07

## 2025-05-29 RX ADMIN — Medication 1000 MILLIGRAM(S): at 00:51

## 2025-05-29 RX ADMIN — KETOROLAC TROMETHAMINE 15 MILLIGRAM(S): 30 INJECTION, SOLUTION INTRAMUSCULAR; INTRAVENOUS at 05:26

## 2025-05-29 RX ADMIN — Medication 105 MILLIGRAM(S): at 11:55

## 2025-05-29 RX ADMIN — Medication 40 MILLIGRAM(S): at 11:06

## 2025-05-29 RX ADMIN — ENOXAPARIN SODIUM 40 MILLIGRAM(S): 100 INJECTION SUBCUTANEOUS at 11:54

## 2025-05-29 RX ADMIN — Medication 1000 MILLIGRAM(S): at 11:55

## 2025-05-29 RX ADMIN — AMLODIPINE BESYLATE 5 MILLIGRAM(S): 10 TABLET ORAL at 05:26

## 2025-05-29 RX ADMIN — KETOROLAC TROMETHAMINE 15 MILLIGRAM(S): 30 INJECTION, SOLUTION INTRAMUSCULAR; INTRAVENOUS at 11:07

## 2025-05-29 NOTE — DISCHARGE NOTE PROVIDER - NSDCFUSCHEDAPPT_GEN_ALL_CORE_FT
Pastor Tanner  [facility]  BARIATRIC LUCERO [address]  Scheduled Appointment: 06/12/2025

## 2025-05-29 NOTE — DISCHARGE NOTE PROVIDER - NSDCFUADDINST_GEN_ALL_CORE_FT
Follow up with Dr. Tanner  in 1 week. Call the office at  to schedule your appointment. You may shower; soap and water over incision sites. Do not scrub. Pat dry when done. No tub bathing or swimming until cleared. Keep incision sites out of the sun as scars will darken. No heavy lifting (>10lbs) or strenuous exercise. Diet: Bariatric Full Fluids. 60 grams protein daily.  64 fluid ounces water daily. Drink small sips throughout the day. Continue diet as outlined by paperwork received as a pre-operative patient. You should be urinating at least 3-4x per day. Call the office if you experience increasing abdominal pain, nausea, vomiting, or temperature >100.4F.  NO ASPIRIN OR NSAIDs until approved by Dr. Tanner. Avoid alcoholic beverages until cleared by Dr. Tanner.       1) Please take Tylenol 650 mg every 4 to 6 hours by mouth for moderate pain control. Please do not exceed 4,000 mg of Tylenol a day.     2) Please start taking Eliquis 2.5 mg by mouth twice a day starting 3 days after surgery. Please start Saturday May 31, 2025.     3) Please take Omeprazole 40 mg once a day by mouth.     4) Please take [id number] mg every 6 hours.     5) Please take Zofran 4 mg every 6 hours as needed for nausea and or vomiting.

## 2025-05-29 NOTE — DIETITIAN INITIAL EVALUATION ADULT - OTHER INFO
"54F pmhx of bipolar disorder, asthma, HLD, GERD, obesity s/p RYGB 2007, hx of lap band with  2014, s/p EGD TOR in March 2024 and April 2025 currently on mounjaro presenting for gastric bypass revision for continued weight gain despite GJ revision and GLP1 analog use to aid her wt loss efforts."    Patient seen on 9wo at bedside. On assessment, pt resting in bed. Currently on Bariatric Clear Liquids (BARICLLIQ) diet, tolerating PO. Pt had sips of water out of the clear, 30cc cups. Pain and nausea well controlled. Discussed volumes of various cup sizes on tray table and encouraged aiming for 4 oz/hr as tolerated. Prepared with protein shakes, with plan to get vitamins after discharge. RD provided in-depth education on diet advancement and specific nutrient needs status-post amirah en y gastric bypass. No known food allergies. No dietary restrictions at home. Skin: surgical incisions. GI: WNL per flowsheet. Labs reviewed: glucose 136 mg/dl; will monitor trends. Pt's wt on admission was 271 pounds, pt's ideal body weight is 115 pounds; ideal body weight to be utilized for nutrient calculations. RD observed pt with no overt signs of muscle or fat wasting. Based on ASPEN guidelines, pt does not meet criteria for malnutrition at this time. RD to continue to follow up.

## 2025-05-29 NOTE — DIETITIAN NUTRITION RISK NOTIFICATION - ADDITIONAL COMMENTS/DIETITIAN RECOMMENDATIONS
does not meet criteria for malnutrition, BMI >40.     Recommendations:   1. Bariatric Clear Liquids diet   2. Recommend advance to phase 1 bariatric full liquid diet when medically feasible   3. Encourage adequate hydration with goal of 4oz/hr and/or 64 oz/day   4. Monitor BMP, BG, POCT, lytes, replete prn

## 2025-05-29 NOTE — DIETITIAN INITIAL EVALUATION ADULT - PERSON TAUGHT/METHOD
RD Discussed volumes of various cup sizes on tray table and encouraged aiming for 4 oz/hr as tolerated. Pt is prepared with protein shakes, with plan to get vitamins after discharge. RD provided indepth education on diet advancement and specific nutrient needs status post amirah en y gastric bypass. Pt appears receptive, verbalized understanding. Written nutrition education handouts provided./verbal instruction/written material/patient instructed

## 2025-05-29 NOTE — DIETITIAN INITIAL EVALUATION ADULT - NSFNSGIIOFT_GEN_A_CORE
I&O's Detail    28 May 2025 07:01  -  29 May 2025 07:00  --------------------------------------------------------  IN:    Lactated Ringers: 2880 mL    Oral Fluid: 360 mL  Total IN: 3240 mL    OUT:    Voided (mL): 1400 mL  Total OUT: 1400 mL    Total NET: 1840 mL      29 May 2025 07:01  -  29 May 2025 11:39  --------------------------------------------------------  IN:    Lactated Ringers: 800 mL  Total IN: 800 mL    OUT:    Voided (mL): 200 mL  Total OUT: 200 mL    Total NET: 600 mL

## 2025-05-29 NOTE — DIETITIAN NUTRITION RISK NOTIFICATION - MALNUTRITION EVALUATION AS DEMONSTRATED BY (ADULTS > 20 YEARS OF AGE)
Not applicable
Implemented All Fall with Harm Risk Interventions:  [place] to call system. Call bell, personal items and telephone within reach. Instruct patient to call for assistance. Room bathroom lighting operational. Non-slip footwear when patient is off stretcher. Physically safe environment: no spills, clutter or unnecessary equipment. Stretcher in lowest position, wheels locked, appropriate side rails in place. Provide visual cue, wrist band, yellow gown, etc. Monitor gait and stability. Monitor for mental status changes and reorient to person, place, and time. Review medications for side effects contributing to fall risk. Reinforce activity limits and safety measures with patient and family. Provide visual clues: red socks.

## 2025-05-29 NOTE — DIETITIAN INITIAL EVALUATION ADULT - PERTINENT LABORATORY DATA
05-29    139  |  108  |  9   ----------------------------<  136[H]  3.7   |  24  |  0.62    Ca    8.6      29 May 2025 07:08  Phos  3.3     05-29  Mg     2.0     05-29

## 2025-05-29 NOTE — DISCHARGE NOTE PROVIDER - HOSPITAL COURSE
54 year female with history of bipolar disorder, asthma, HLD, GERD, morbid obesity (BMI 47)  s/p RYGB 2007, hx of lap band with  2014, s/p EGD TOR in March 2024 and April 2025 now s/p RA lap revision of gastric bypass and lap band removal. Pt tolerated the procedure well. At time of discharge, pt was tolerating a bariatric clear liquid diet, and pt's pain was controlled. Plan is to follow up with Dr. Tanner in the office.

## 2025-05-29 NOTE — PROGRESS NOTE ADULT - SUBJECTIVE AND OBJECTIVE BOX
INTERVAL HPI/OVERNIGHT EVENTS:    STATUS POST:      POST OPERATIVE DAY #:     SUBJECTIVE:      amLODIPine   Tablet 5 milliGRAM(s) Oral daily      Vital Signs Last 24 Hrs  T(C): 37.2 (29 May 2025 06:00), Max: 37.6 (28 May 2025 11:31)  T(F): 98.9 (29 May 2025 06:00), Max: 99.6 (28 May 2025 11:31)  HR: 82 (29 May 2025 06:00) (69 - 96)  BP: 121/83 (29 May 2025 06:00) (98/56 - 139/73)  BP(mean): 93 (29 May 2025 06:00) (72 - 105)  RR: 18 (29 May 2025 06:00) (12 - 19)  SpO2: 98% (29 May 2025 06:00) (93% - 100%)    Parameters below as of 29 May 2025 06:00  Patient On (Oxygen Delivery Method): room air      I&O's Detail    28 May 2025 07:01  -  29 May 2025 06:14  --------------------------------------------------------  IN:    Lactated Ringers: 1120 mL  Total IN: 1120 mL    OUT:    Voided (mL): 600 mL  Total OUT: 600 mL    Total NET: 520 mL          General: NAD, resting comfortably in bed  C/V: NSR  Pulm: Nonlabored breathing, no respiratory distress  Abd: soft, NT/ND.  Extrem: WWP, no edema, SCDs in place  Drains:  Jes:      LABS:                        12.5   9.43  )-----------( 302      ( 28 May 2025 19:01 )             38.6                 RADIOLOGY & ADDITIONAL STUDIES:   INTERVAL HPI/OVERNIGHT EVENTS: 6PM Hgb 12.5(12.8). Got TXA, toradol. Pain well managed. Drinking at goal.     STATUS POST:  RA lap revision of gastric bypass and lap band removal    POST OPERATIVE DAY #:     SUBJECTIVE:      amLODIPine   Tablet 5 milliGRAM(s) Oral daily      Vital Signs Last 24 Hrs  T(C): 37.2 (29 May 2025 06:00), Max: 37.6 (28 May 2025 11:31)  T(F): 98.9 (29 May 2025 06:00), Max: 99.6 (28 May 2025 11:31)  HR: 82 (29 May 2025 06:00) (69 - 96)  BP: 121/83 (29 May 2025 06:00) (98/56 - 139/73)  BP(mean): 93 (29 May 2025 06:00) (72 - 105)  RR: 18 (29 May 2025 06:00) (12 - 19)  SpO2: 98% (29 May 2025 06:00) (93% - 100%)    Parameters below as of 29 May 2025 06:00  Patient On (Oxygen Delivery Method): room air      I&O's Detail    28 May 2025 07:01  -  29 May 2025 06:14  --------------------------------------------------------  IN:    Lactated Ringers: 1120 mL  Total IN: 1120 mL    OUT:    Voided (mL): 600 mL  Total OUT: 600 mL    Total NET: 520 mL          General: NAD, resting comfortably in bed  C/V: NSR  Pulm: Nonlabored breathing, no respiratory distress  Abd: soft, NT/ND.  Extrem: WWP, no edema, SCDs in place  Drains:  Jes:      LABS:                        12.5   9.43  )-----------( 302      ( 28 May 2025 19:01 )             38.6                 RADIOLOGY & ADDITIONAL STUDIES:   INTERVAL HPI/OVERNIGHT EVENTS: 6PM Hgb 12.5(12.8). Got TXA, toradol. Pain well managed. Drinking at goal.     STATUS POST:  RA lap revision of gastric bypass and lap band removal    POST OPERATIVE DAY #: 1    SUBJECTIVE: Patient seen and examined resting comfortably in bed. Patient       amLODIPine   Tablet 5 milliGRAM(s) Oral daily      Vital Signs Last 24 Hrs  T(C): 37.2 (29 May 2025 06:00), Max: 37.6 (28 May 2025 11:31)  T(F): 98.9 (29 May 2025 06:00), Max: 99.6 (28 May 2025 11:31)  HR: 82 (29 May 2025 06:00) (69 - 96)  BP: 121/83 (29 May 2025 06:00) (98/56 - 139/73)  BP(mean): 93 (29 May 2025 06:00) (72 - 105)  RR: 18 (29 May 2025 06:00) (12 - 19)  SpO2: 98% (29 May 2025 06:00) (93% - 100%)    Parameters below as of 29 May 2025 06:00  Patient On (Oxygen Delivery Method): room air      I&O's Detail    28 May 2025 07:01  -  29 May 2025 06:14  --------------------------------------------------------  IN:    Lactated Ringers: 1120 mL  Total IN: 1120 mL    OUT:    Voided (mL): 600 mL  Total OUT: 600 mL    Total NET: 520 mL          General: NAD, resting comfortably in bed  C/V: NSR  Pulm: Nonlabored breathing, no respiratory distress  Abd: soft, NT/ND.  Extrem: WWP, no edema, SCDs in place  Drains:  Jes:      LABS:                        12.5   9.43  )-----------( 302      ( 28 May 2025 19:01 )             38.6                 RADIOLOGY & ADDITIONAL STUDIES:   INTERVAL HPI/OVERNIGHT EVENTS: 6PM Hgb 12.5(12.8). Got TXA, toradol. Pain well managed. Drinking at goal.     STATUS POST:  RA lap revision of gastric bypass and lap band removal    POST OPERATIVE DAY #: 1    SUBJECTIVE: Patient seen and examined resting comfortably in bed. Patient is tolerating a good amount of bariatric liquids without nausea or vomiting. States pain has been well controlled and improving with medication regimen. She is OOBA and voiding well. Denies acute complaints. Denies chest pain, SOB, HA, dizziness, fever or chills.       amLODIPine   Tablet 5 milliGRAM(s) Oral daily      Vital Signs Last 24 Hrs  T(C): 37.2 (29 May 2025 06:00), Max: 37.6 (28 May 2025 11:31)  T(F): 98.9 (29 May 2025 06:00), Max: 99.6 (28 May 2025 11:31)  HR: 82 (29 May 2025 06:00) (69 - 96)  BP: 121/83 (29 May 2025 06:00) (98/56 - 139/73)  BP(mean): 93 (29 May 2025 06:00) (72 - 105)  RR: 18 (29 May 2025 06:00) (12 - 19)  SpO2: 98% (29 May 2025 06:00) (93% - 100%)    Parameters below as of 29 May 2025 06:00  Patient On (Oxygen Delivery Method): room air      I&O's Detail    28 May 2025 07:01  -  29 May 2025 06:14  --------------------------------------------------------  IN:    Lactated Ringers: 1120 mL  Total IN: 1120 mL    OUT:    Voided (mL): 600 mL  Total OUT: 600 mL    Total NET: 520 mL          General: NAD, resting comfortably in bed  C/V: NSR  Pulm: Nonlabored breathing, no respiratory distress  Abd: soft, NT/ND. Incisionc clean dry and intact   Extrem: WWP, no edema, SCDs in place      LABS:                        12.5   9.43  )-----------( 302      ( 28 May 2025 19:01 )             38.6                 RADIOLOGY & ADDITIONAL STUDIES:

## 2025-05-29 NOTE — DIETITIAN INITIAL EVALUATION ADULT - OTHER CALCULATIONS
Based on needs for first 2 weeks status post amirah en y gastric bypass; ideal body weight used for calculations as pt >120% ideal body weight  Estimated needs following 2 weeks post-op: estimated energy needs: 7821-0092 kcal (20-25 kcal/kg IBW 52.1 kg)

## 2025-05-29 NOTE — DISCHARGE NOTE PROVIDER - DETAILS OF MALNUTRITION DIAGNOSIS/DIAGNOSES
This patient has been assessed with a concern for Malnutrition and was treated during this hospitalization for the following Nutrition diagnosis/diagnoses:     -  05/29/2025: Morbid obesity (BMI > 40)

## 2025-05-29 NOTE — DIETITIAN INITIAL EVALUATION ADULT - ETIOLOGY
related to need for educational review on the diet advancement process and specific nutrient needs status post amirah en y gastric bypass

## 2025-05-29 NOTE — DISCHARGE NOTE PROVIDER - NSDCCPCAREPLAN_GEN_ALL_CORE_FT
PRINCIPAL DISCHARGE DIAGNOSIS  Diagnosis: Morbid obesity  Assessment and Plan of Treatment: 54 year female with history of bipolar disorder, asthma, HLD, GERD, morbid obesity (BMI 47)  s/p RYGB 2007, hx of lap band with  2014, s/p EGD TOR in March 2024 and April 2025 now s/p RA lap revision of gastric bypass and lap band removal. Pt tolerated the procedure well. At time of discharge, pt was tolerating a bariatric clear liquid diet, and pt's pain was controlled. Plan is to follow up with Dr. Tanner in the office.  1) Please take Tylenol 650 mg every 4 to 6 hours by mouth for moderate pain control. Please do not exceed 4,000 mg of Tylenol a day.   2) Please start taking Eliquis 2.5 mg by mouth twice a day starting 3 days after surgery. Please start Saturday May 31, 2025.   3) Please take Omeprazole 40 mg once a day by mouth.   4) Please take [id number] mg every 6 hours.   5) Please take Zofran 4 mg every 6 hours as needed for nausea and or vomiting.

## 2025-05-29 NOTE — DISCHARGE NOTE NURSING/CASE MANAGEMENT/SOCIAL WORK - PATIENT PORTAL LINK FT
You can access the FollowMyHealth Patient Portal offered by [facility] by registering at the following website: [email or web address]. By joining [employer]’s FollowMyHealth portal, you will also be able to view your health information using other applications (apps) compatible with our system.

## 2025-05-29 NOTE — PROGRESS NOTE ADULT - ASSESSMENT
54F pmhx of bipolar disorder, asthma, HLD, GERD, obesity s/p RYGB 2007, hx of lap band with  2014, s/p EGD TOR in March 2024 and April 2025 now s/p RA lap revision of gastric bypass and lap band removal    BCLD/IVF  pain/nausea control  OOBA/SCD/IS  Lovenox POD 1  AM labs  Nutrition consult  
54F pmhx of bipolar disorder, asthma, HLD, GERD, obesity s/p RYGB 2007, hx of lap band with  2014, s/p EGD TOR in March 2024 and April 2025 now s/p RA lap revision of gastric bypass and lap band removal    BCLD/IVF  pain/nausea control  OOBA/SCD/IS  Lovenox POD 1  AM labs  Nutrition consult

## 2025-05-29 NOTE — DISCHARGE NOTE PROVIDER - CARE PROVIDER_API CALL
Pastor Tanner  Surgery  [address] [place] [id number]  Phone: [phone]  Fax: [phone]  Scheduled Appointment: 06/12/2025

## 2025-05-29 NOTE — DIETITIAN INITIAL EVALUATION ADULT - PERTINENT MEDS FT
MEDICATIONS  (STANDING):  acetaminophen   Oral Liquid .. 1000 milliGRAM(s) Oral every 6 hours  amLODIPine   Tablet 5 milliGRAM(s) Oral daily  enoxaparin Injectable 40 milliGRAM(s) SubCutaneous every 24 hours  ketorolac   Injectable 15 milliGRAM(s) IV Push every 6 hours  lactated ringers. 1000 milliLiter(s) (160 mL/Hr) IV Continuous <Continuous>  pantoprazole  Injectable 40 milliGRAM(s) IV Push daily  rosuvastatin 5 milliGRAM(s) Oral at bedtime  thiamine IVPB 500 milliGRAM(s) IV Intermittent every 24 hours    MEDICATIONS  (PRN):  HYDROmorphone  Injectable 0.5 milliGRAM(s) IV Push every 30 minutes PRN severe breakthrough pain  lidocaine   4% Patch 1 Patch Transdermal every 24 hours PRN back pain  melatonin Liquid 5 milliGRAM(s) Oral at bedtime PRN Insomnia  ondansetron Injectable 4 milliGRAM(s) IV Push every 6 hours PRN Nausea and/or Vomiting  oxyCODONE    Solution 2.5 milliGRAM(s) Oral every 6 hours PRN Moderate Pain (4 - 6)  oxyCODONE    Solution 5 milliGRAM(s) Oral every 6 hours PRN Severe Pain (7 - 10)

## 2025-05-29 NOTE — DIETITIAN INITIAL EVALUATION ADULT - ADD RECOMMEND
1. Bariatric Clear Liquids diet   2. Recommend advance to phase 1 bariatric full liquid diet when medically feasible   3. Encourage adequate hydration with goal of 4oz/hr and/or 64 oz/day   4. Monitor BMP, BG, POCT, lytes, replete prn

## 2025-05-29 NOTE — DISCHARGE NOTE PROVIDER - NSDCMRMEDTOKEN_GEN_ALL_CORE_FT
amLODIPine 5 mg oral tablet: 1 tab(s) orally once a day  esomeprazole 40 mg oral delayed release capsule: 1 cap(s) orally as needed for PRN  fluticasone-vilanterol 200 mcg-25 mcg/inh inhalation powder: 1 inhaled as needed for PRN  gabapentin 600 mg/24 hours oral tablet, extended release: 1 tab(s) orally once a day  Mobic 15 mg oral tablet: 1 tab(s) orally as needed for PRN  rosuvastatin 5 mg oral capsule: 1 cap(s) orally once a day  Zepbound 7.5 mg/0.5 mL subcutaneous solution: 7.5 milligram(s) subcutaneously once a week   acetaminophen 160 mg/5 mL oral liquid: 20 milliliter(s) orally every 6 hours as needed for -for moderate to severe pain MDD: 80 mL  amLODIPine 5 mg oral tablet: 1 tab(s) orally once a day  Eliquis 2.5 mg oral tablet: 1 tab(s) orally 2 times a day MDD:2 tablets MDD: 2 tabs  fluticasone-vilanterol 200 mcg-25 mcg/inh inhalation powder: 1 inhaled as needed for PRN  Levsin SL 0.125 mg sublingual tablet: 1 tab(s) sublingually every 6 hours MDD: 4 TABS  omeprazole 40 mg oral delayed release capsule: 1 cap(s) orally once a day MDD: 1 cap  ondansetron 4 mg oral tablet, disintegratin tab(s) orally every 6 hours as needed for  nausea MDD: 4  rosuvastatin 5 mg oral capsule: 1 cap(s) orally once a day

## 2025-05-29 NOTE — DISCHARGE NOTE PROVIDER - NSDCCPTREATMENT_GEN_ALL_CORE_FT
PRINCIPAL PROCEDURE  Procedure: Robot-assisted revision of gastric bypass  Findings and Treatment:       SECONDARY PROCEDURE  Procedure: Robot-assisted laparoscopic removal of gastric band  Findings and Treatment:

## 2025-05-29 NOTE — DISCHARGE NOTE NURSING/CASE MANAGEMENT/SOCIAL WORK - FINANCIAL ASSISTANCE
[facility] provides services at a reduced cost to those who are determined to be eligible through [facility]’s financial assistance program. Information regarding [facility]’s financial assistance program can be found by going to [email or web address] or by calling [phone].

## 2025-06-02 ENCOUNTER — NON-APPOINTMENT (OUTPATIENT)
Age: 55
End: 2025-06-02

## 2025-06-02 RX ORDER — ACETAMINOPHEN 325 MG/10.15ML
325 SOLUTION ORAL EVERY 4 HOURS
Qty: 24 | Refills: 0 | Status: ACTIVE | COMMUNITY
Start: 2025-06-02 | End: 1900-01-01

## 2025-06-03 RX ORDER — ACETAMINOPHEN 650 MG/20.3ML
650 SUSPENSION ORAL EVERY 8 HOURS
Qty: 14 | Refills: 0 | Status: ACTIVE | COMMUNITY
Start: 2025-06-03 | End: 1900-01-01

## 2025-06-04 RX ORDER — ACETAMINOPHEN 500 MG/1
500 TABLET ORAL EVERY 6 HOURS
Qty: 30 | Refills: 0 | Status: ACTIVE | COMMUNITY
Start: 2025-06-04 | End: 1900-01-01

## 2025-06-04 RX ORDER — ACETAMINOPHEN 650 MG/20.3ML
650 SOLUTION ORAL EVERY 6 HOURS
Qty: 20 | Refills: 0 | Status: DISCONTINUED | COMMUNITY
Start: 2025-06-04 | End: 2025-06-04

## 2025-06-05 ENCOUNTER — APPOINTMENT (OUTPATIENT)
Dept: BARIATRICS | Facility: CLINIC | Age: 55
End: 2025-06-05
Payer: COMMERCIAL

## 2025-06-05 VITALS
TEMPERATURE: 97 F | BODY MASS INDEX: 48.76 KG/M2 | HEIGHT: 62 IN | SYSTOLIC BLOOD PRESSURE: 146 MMHG | DIASTOLIC BLOOD PRESSURE: 93 MMHG | WEIGHT: 265 LBS | HEART RATE: 94 BPM | OXYGEN SATURATION: 98 %

## 2025-06-05 DIAGNOSIS — R10.9 UNSPECIFIED ABDOMINAL PAIN: ICD-10-CM

## 2025-06-05 DIAGNOSIS — Z98.84 BARIATRIC SURGERY STATUS: ICD-10-CM

## 2025-06-05 PROBLEM — E66.9 OBESITY, UNSPECIFIED: Chronic | Status: ACTIVE | Noted: 2025-05-27

## 2025-06-05 PROCEDURE — 99024 POSTOP FOLLOW-UP VISIT: CPT

## 2025-06-19 ENCOUNTER — APPOINTMENT (OUTPATIENT)
Dept: BARIATRICS | Facility: CLINIC | Age: 55
End: 2025-06-19

## (undated) DEVICE — XI STAPLER SUREFORM 60

## (undated) DEVICE — GLV 7.5 PROTEXIS (WHITE)

## (undated) DEVICE — XI SCISSOR TIP COVER

## (undated) DEVICE — XI DRAPE ARM

## (undated) DEVICE — PROBE FIAPC DIA 2.3MM/7FR LNTH 220CM/7.2FT

## (undated) DEVICE — SUT MONOCRYL 4-0 27" PS-2 UNDYED

## (undated) DEVICE — SUT PDO 2-0 1/2 CIRCLE 26MM NDL 15CM

## (undated) DEVICE — Device

## (undated) DEVICE — XI OBTURATOR OPTICAL BLADELESS 8MM

## (undated) DEVICE — XI SEAL UNIVERSIAL 5-12MM

## (undated) DEVICE — TUBING ERBE CO2 OLYMPUS CONNECTOR

## (undated) DEVICE — SUT PDO 2-0 1/2 CIRCLE 17MM NDL 20CM

## (undated) DEVICE — SUT ETHIBOND 2-0 36" SH

## (undated) DEVICE — XI 12MM AND STAPLER CANNULA SEAL

## (undated) DEVICE — SUT STRATAFIX SPIRAL PDS PLUS 2-0 10X10CM SH VIOLET

## (undated) DEVICE — SUT OVERSTITCH POLYPROPYLENE 2-0

## (undated) DEVICE — TUBING HYBRID CO2

## (undated) DEVICE — TROCAR COVIDIEN VERSAONE FIXATION CANNULA 5MM

## (undated) DEVICE — ELCTR GROUNDING PAD ADULT COVIDIEN

## (undated) DEVICE — XI VESSEL SEALER

## (undated) DEVICE — KIT ENDO PROCEDURE CUST W/VLV

## (undated) DEVICE — ELCTR BOVIE PENCIL HANDPIECE ROCKER SWITCH 15FT

## (undated) DEVICE — XI DRAPE COLUMN

## (undated) DEVICE — XI ENDOWRIST 12 - 8 MM CANNULA REDUCER

## (undated) DEVICE — SUT OVERSTITCH ENDOSCOPIC SYS

## (undated) DEVICE — TROCAR COVIDIEN VERSAPORT BLADELESS OPTICAL 12MM STANDARD

## (undated) DEVICE — TROCAR COVIDIEN VERSAPORT BLADELESS OPTICAL 12MM LONG

## (undated) DEVICE — SUT OVERSTITCH CLINCH

## (undated) DEVICE — INSUFFLATION NDL ETHICON ENDOPATH PNEUMOPARITONEUM 150MM

## (undated) DEVICE — PACK GENERAL LAPAROSCOPY